# Patient Record
Sex: FEMALE | Race: BLACK OR AFRICAN AMERICAN | Employment: FULL TIME | URBAN - METROPOLITAN AREA
[De-identification: names, ages, dates, MRNs, and addresses within clinical notes are randomized per-mention and may not be internally consistent; named-entity substitution may affect disease eponyms.]

---

## 2017-05-17 ENCOUNTER — TELEPHONE (OUTPATIENT)
Dept: INTERNAL MEDICINE CLINIC | Facility: CLINIC | Age: 52
End: 2017-05-17

## 2017-05-18 ENCOUNTER — OFFICE VISIT (OUTPATIENT)
Dept: INTERNAL MEDICINE CLINIC | Facility: CLINIC | Age: 52
End: 2017-05-18

## 2017-05-18 VITALS
RESPIRATION RATE: 18 BRPM | DIASTOLIC BLOOD PRESSURE: 84 MMHG | TEMPERATURE: 98 F | SYSTOLIC BLOOD PRESSURE: 134 MMHG | WEIGHT: 170 LBS | HEIGHT: 61 IN | BODY MASS INDEX: 32.1 KG/M2 | HEART RATE: 82 BPM

## 2017-05-18 DIAGNOSIS — J01.90 ACUTE NON-RECURRENT SINUSITIS, UNSPECIFIED LOCATION: Primary | ICD-10-CM

## 2017-05-18 PROCEDURE — 99214 OFFICE O/P EST MOD 30 MIN: CPT | Performed by: INTERNAL MEDICINE

## 2017-05-18 PROCEDURE — 99212 OFFICE O/P EST SF 10 MIN: CPT | Performed by: INTERNAL MEDICINE

## 2017-05-18 RX ORDER — AZITHROMYCIN 250 MG/1
TABLET, FILM COATED ORAL
Qty: 6 TABLET | Refills: 0 | Status: SHIPPED | OUTPATIENT
Start: 2017-05-18 | End: 2017-12-18

## 2017-05-18 NOTE — PROGRESS NOTES
HPI:    Patient ID: Earl Agarwal is a 54year old female. Cough  This is a new problem. The current episode started 1 to 4 weeks ago (3 weeks). The problem has been unchanged. The problem occurs every few hours. The cough is non-productive.  Associat and rhinorrhea present. Right sinus exhibits maxillary sinus tenderness. Right sinus exhibits no frontal sinus tenderness. Left sinus exhibits maxillary sinus tenderness. Left sinus exhibits no frontal sinus tenderness.    Mouth/Throat: Oropharyngeal exudat

## 2017-05-30 ENCOUNTER — TELEPHONE (OUTPATIENT)
Dept: INTERNAL MEDICINE CLINIC | Facility: CLINIC | Age: 52
End: 2017-05-30

## 2017-05-30 NOTE — TELEPHONE ENCOUNTER
Per pt.'s request, most recent lab results faxed and confirmed sent to her podiatrist, Dr. Mayo Tijerina.

## 2017-05-30 NOTE — TELEPHONE ENCOUNTER
Pt requesting most recent labs to  Dr Ida Han/Podiatry  FAX# [phone]    States has fungus on toe nail- podiatrist wants to make sure liver ok before prescribing medication    Pt requesting callback to confirm

## 2017-09-29 ENCOUNTER — TELEPHONE (OUTPATIENT)
Dept: INTERNAL MEDICINE CLINIC | Facility: CLINIC | Age: 52
End: 2017-09-29

## 2017-09-29 RX ORDER — DILTIAZEM HYDROCHLORIDE EXTENDED-RELEASE TABLETS 240 MG/1
240 TABLET, EXTENDED RELEASE ORAL
Qty: 30 TABLET | Refills: 0 | Status: SHIPPED | OUTPATIENT
Start: 2017-09-29 | End: 2017-10-29

## 2017-09-29 NOTE — TELEPHONE ENCOUNTER
30-day refill sent per protocol to cover until pt can be seen for OV.     Hypertensive Medications  Protocol Criteria:  · Appointment scheduled in the past 6 months or in the next 3 months  · BMP or CMP in the past 12 months  · Creatinine result < 2  Recent

## 2017-09-29 NOTE — TELEPHONE ENCOUNTER
Pt called in requesting a refill on medication below. Pt states she only has a few days left, but she did schedule an appt on 10/2 to see Dr. Yen Park. Please advise on refill.     Current Outpatient Prescriptions:     •  Diltiazem HCl ER Coated Beads 240 MG O

## 2017-10-02 ENCOUNTER — OFFICE VISIT (OUTPATIENT)
Dept: INTERNAL MEDICINE CLINIC | Facility: CLINIC | Age: 52
End: 2017-10-02

## 2017-10-02 VITALS
HEIGHT: 62 IN | RESPIRATION RATE: 12 BRPM | WEIGHT: 177 LBS | TEMPERATURE: 98 F | BODY MASS INDEX: 32.57 KG/M2 | SYSTOLIC BLOOD PRESSURE: 152 MMHG | HEART RATE: 80 BPM | DIASTOLIC BLOOD PRESSURE: 83 MMHG

## 2017-10-02 DIAGNOSIS — S16.1XXA ACUTE STRAIN OF NECK MUSCLE, INITIAL ENCOUNTER: Primary | ICD-10-CM

## 2017-10-02 DIAGNOSIS — S39.012A LOW BACK STRAIN, INITIAL ENCOUNTER: ICD-10-CM

## 2017-10-02 PROCEDURE — 99214 OFFICE O/P EST MOD 30 MIN: CPT | Performed by: INTERNAL MEDICINE

## 2017-10-02 PROCEDURE — 99212 OFFICE O/P EST SF 10 MIN: CPT | Performed by: INTERNAL MEDICINE

## 2017-10-02 RX ORDER — CYCLOBENZAPRINE HCL 10 MG
1 TABLET ORAL
COMMUNITY
Start: 2017-09-28 | End: 2017-10-12

## 2017-10-12 ENCOUNTER — TELEPHONE (OUTPATIENT)
Dept: OTHER | Age: 52
End: 2017-10-12

## 2017-10-12 RX ORDER — CYCLOBENZAPRINE HCL 10 MG
10 TABLET ORAL
Qty: 30 TABLET | Refills: 0 | Status: SHIPPED | OUTPATIENT
Start: 2017-10-12 | End: 2018-08-06

## 2017-10-12 NOTE — TELEPHONE ENCOUNTER
Please advise on return to work  / light duty restriction note? Ok to write note? Pt is due to return to work Saturday but wants to make sure she can return on light duty.

## 2017-10-12 NOTE — TELEPHONE ENCOUNTER
Pt agreed with letter with light duty, pt states she still does not feel well but has to return to work. If she feels like she can not return just yet she will contact our office.

## 2017-10-12 NOTE — TELEPHONE ENCOUNTER
EL pt asking for refill of muscle relaxer and RTW note with light duty restriction    Pain in back and neck was 7/10 at LOV   Today pain level is 5/10 at back and neck, intermittent, Pt does not want to injure it more when she returns to work in 2 days, An

## 2017-10-24 ENCOUNTER — OFFICE VISIT (OUTPATIENT)
Dept: PHYSICAL THERAPY | Age: 52
End: 2017-10-24
Attending: INTERNAL MEDICINE
Payer: COMMERCIAL

## 2017-10-24 DIAGNOSIS — S16.1XXA ACUTE STRAIN OF NECK MUSCLE, INITIAL ENCOUNTER: ICD-10-CM

## 2017-10-24 DIAGNOSIS — S39.012A LOW BACK STRAIN, INITIAL ENCOUNTER: ICD-10-CM

## 2017-10-24 PROCEDURE — 97110 THERAPEUTIC EXERCISES: CPT

## 2017-10-24 PROCEDURE — 97162 PT EVAL MOD COMPLEX 30 MIN: CPT
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
## 2017-10-26 ENCOUNTER — OFFICE VISIT (OUTPATIENT)
Dept: PHYSICAL THERAPY | Age: 52
End: 2017-10-26
Attending: INTERNAL MEDICINE
Payer: COMMERCIAL

## 2017-10-26 DIAGNOSIS — S16.1XXA ACUTE STRAIN OF NECK MUSCLE, INITIAL ENCOUNTER: ICD-10-CM

## 2017-10-26 DIAGNOSIS — S39.012A LOW BACK STRAIN, INITIAL ENCOUNTER: ICD-10-CM

## 2017-10-26 PROCEDURE — 97110 THERAPEUTIC EXERCISES: CPT

## 2017-10-26 NOTE — PROGRESS NOTES
Dx: Acute strain of neck muscle, initial encounter (S16.1XXA)  Low back strain, initial encounter (E27.904M)                                 Next MD visit: none scheduled  Fall Risk: standard         Precautions: n/a           Medications: Yes/no: no  Subj

## 2017-10-29 RX ORDER — DILTIAZEM HYDROCHLORIDE 240 MG/1
CAPSULE, COATED, EXTENDED RELEASE ORAL
Qty: 90 CAPSULE | Refills: 1 | Status: SHIPPED | OUTPATIENT
Start: 2017-10-29 | End: 2018-05-10

## 2017-10-31 ENCOUNTER — APPOINTMENT (OUTPATIENT)
Dept: PHYSICAL THERAPY | Age: 52
End: 2017-10-31
Attending: INTERNAL MEDICINE
Payer: COMMERCIAL

## 2017-11-02 ENCOUNTER — OFFICE VISIT (OUTPATIENT)
Dept: PHYSICAL THERAPY | Age: 52
End: 2017-11-02
Attending: INTERNAL MEDICINE
Payer: COMMERCIAL

## 2017-11-02 DIAGNOSIS — S16.1XXA ACUTE STRAIN OF NECK MUSCLE, INITIAL ENCOUNTER: ICD-10-CM

## 2017-11-02 DIAGNOSIS — S39.012A LOW BACK STRAIN, INITIAL ENCOUNTER: ICD-10-CM

## 2017-11-02 PROCEDURE — 97110 THERAPEUTIC EXERCISES: CPT

## 2017-11-02 NOTE — PROGRESS NOTES
Dx: Acute strain of neck muscle, initial encounter (S16.1XXA)  Low back strain, initial encounter (L42.039A)                                 Next MD visit: none scheduled  Fall Risk: standard         Precautions: n/a           Medications: Yes/no: no  Subj Education done/HEP given:same exercises but will continue with scapula exercises    Plan: cont per POC    Charges:  Theraex x3       Total Timed Treatment: 45 min  Total Treatment Time: 45 min

## 2017-11-07 ENCOUNTER — TELEPHONE (OUTPATIENT)
Dept: PHYSICAL THERAPY | Age: 52
End: 2017-11-07

## 2017-11-07 ENCOUNTER — APPOINTMENT (OUTPATIENT)
Dept: PHYSICAL THERAPY | Age: 52
End: 2017-11-07
Attending: INTERNAL MEDICINE
Payer: COMMERCIAL

## 2017-11-10 ENCOUNTER — TELEPHONE (OUTPATIENT)
Dept: PHYSICAL THERAPY | Age: 52
End: 2017-11-10

## 2017-11-14 ENCOUNTER — OFFICE VISIT (OUTPATIENT)
Dept: PHYSICAL THERAPY | Age: 52
End: 2017-11-14
Attending: INTERNAL MEDICINE
Payer: COMMERCIAL

## 2017-11-14 DIAGNOSIS — S16.1XXA ACUTE STRAIN OF NECK MUSCLE, INITIAL ENCOUNTER: ICD-10-CM

## 2017-11-14 DIAGNOSIS — S39.012A LOW BACK STRAIN, INITIAL ENCOUNTER: ICD-10-CM

## 2017-11-14 PROCEDURE — 97110 THERAPEUTIC EXERCISES: CPT

## 2017-11-14 PROCEDURE — 97014 ELECTRIC STIMULATION THERAPY: CPT

## 2017-11-14 NOTE — PROGRESS NOTES
Dx: Acute strain of neck muscle, initial encounter (S16.1XXA)  Low back strain, initial encounter (E12.642X)                                 Next MD visit: none scheduled  Fall Risk: standard         Precautions: n/a           Medications: Yes/no: no  Subj 4 bands 2x10  Shuttle  BLE heel raises 3 bands 2x10 Shuttle BLE 6 bands  shutttle R/L LE 4 bands 2x10  Shuttle  BLE heel raises 3 bands 2x10     Supine SLR x10  IFC 1-150Hz x20 min with HP              Assessment: Imrpoved AROM om cervical and lumbar area

## 2017-11-16 ENCOUNTER — APPOINTMENT (OUTPATIENT)
Dept: PHYSICAL THERAPY | Age: 52
End: 2017-11-16
Attending: INTERNAL MEDICINE
Payer: COMMERCIAL

## 2017-11-21 ENCOUNTER — TELEPHONE (OUTPATIENT)
Dept: PHYSICAL THERAPY | Age: 52
End: 2017-11-21

## 2017-11-21 ENCOUNTER — APPOINTMENT (OUTPATIENT)
Dept: PHYSICAL THERAPY | Age: 52
End: 2017-11-21
Attending: INTERNAL MEDICINE
Payer: COMMERCIAL

## 2017-11-28 ENCOUNTER — TELEPHONE (OUTPATIENT)
Dept: PHYSICAL THERAPY | Age: 52
End: 2017-11-28

## 2017-11-28 ENCOUNTER — APPOINTMENT (OUTPATIENT)
Dept: PHYSICAL THERAPY | Age: 52
End: 2017-11-28
Attending: INTERNAL MEDICINE
Payer: COMMERCIAL

## 2017-12-01 ENCOUNTER — APPOINTMENT (OUTPATIENT)
Dept: PHYSICAL THERAPY | Age: 52
End: 2017-12-01
Attending: INTERNAL MEDICINE
Payer: COMMERCIAL

## 2017-12-04 ENCOUNTER — TELEPHONE (OUTPATIENT)
Dept: INTERNAL MEDICINE CLINIC | Facility: CLINIC | Age: 52
End: 2017-12-04

## 2017-12-04 DIAGNOSIS — Z12.39 BREAST CANCER SCREENING: Primary | ICD-10-CM

## 2017-12-04 NOTE — TELEPHONE ENCOUNTER
Pt called in stating that she is requesting her mammogram order. Pt has attempted to schedule appt and was informed she needed her order. Please advise once approved.

## 2017-12-04 NOTE — TELEPHONE ENCOUNTER
Pt is requesting an order for 1 year mammogram. Pt is requesting a call back once approved. Thank you.

## 2017-12-05 ENCOUNTER — OFFICE VISIT (OUTPATIENT)
Dept: PHYSICAL THERAPY | Age: 52
End: 2017-12-05
Attending: INTERNAL MEDICINE
Payer: COMMERCIAL

## 2017-12-05 PROCEDURE — 97014 ELECTRIC STIMULATION THERAPY: CPT

## 2017-12-05 PROCEDURE — 97110 THERAPEUTIC EXERCISES: CPT

## 2017-12-05 NOTE — PROGRESS NOTES
Dx: Acute strain of neck muscle, initial encounter (S16.1XXA)  Low back strain, initial encounter (Y89.279Y)                                 Next MD visit: none scheduled  Fall Risk: standard         Precautions: n/a           Medications: Yes/no: no  Subj then extension and with ER combo without bands x10x2    Overhead abd ball movement 2x10 then backward and forward around trunk     supine NWB rotation 10 secs x10 Pinky on the wall 2x10      Supine hooklying hip abd YTB 2x10 Shuttle BLE 6 bands  иван R dog  4. Pt will report decreased in pain and symptoms by at least 50% or better for ease of daily tasks like dressing               Education done/HEP given:same exercises but will continue with scapula exercises    Plan: cont per POC    Charges:  Theraex x2

## 2017-12-07 ENCOUNTER — OFFICE VISIT (OUTPATIENT)
Dept: PHYSICAL THERAPY | Age: 52
End: 2017-12-07
Attending: INTERNAL MEDICINE
Payer: COMMERCIAL

## 2017-12-07 PROCEDURE — 97110 THERAPEUTIC EXERCISES: CPT

## 2017-12-07 PROCEDURE — 97014 ELECTRIC STIMULATION THERAPY: CPT

## 2017-12-07 NOTE — PROGRESS NOTES
Dx: Acute strain of neck muscle, initial encounter (S16.1XXA)  Low back strain, initial encounter (H41.322D)                                 Next MD visit: none scheduled  Fall Risk: standard         Precautions: n/a           Medications: Yes/no: no  Subj pain , 4/10 on low back  SHAYNE x2x10  EIL x10 then HOB 2x10   Supine marches 2x10 Scapula retraction :YTB narrow  then extension and with ER combo without bands x10x2    Overhead abd ball movement 2x10 then backward and forward around trunk  Prone I  Prone h

## 2017-12-12 ENCOUNTER — APPOINTMENT (OUTPATIENT)
Dept: PHYSICAL THERAPY | Age: 52
End: 2017-12-12
Attending: INTERNAL MEDICINE
Payer: COMMERCIAL

## 2017-12-12 ENCOUNTER — TELEPHONE (OUTPATIENT)
Dept: PHYSICAL THERAPY | Age: 52
End: 2017-12-12

## 2017-12-13 ENCOUNTER — OFFICE VISIT (OUTPATIENT)
Dept: PHYSICAL THERAPY | Age: 52
End: 2017-12-13
Attending: INTERNAL MEDICINE
Payer: COMMERCIAL

## 2017-12-13 PROCEDURE — 97014 ELECTRIC STIMULATION THERAPY: CPT

## 2017-12-13 PROCEDURE — 97110 THERAPEUTIC EXERCISES: CPT

## 2017-12-13 NOTE — PROGRESS NOTES
Dx: Acute strain of neck muscle, initial encounter (S16.1XXA)  Low back strain, initial encounter (J87.331O)                                 Next MD visit: none scheduled  Fall Risk: standard         Precautions: n/a           Medications: Yes/no: no  Subj pain , 4/10 on low back  SHAYNE x2x10  EIL x10 then HOB 2x10   Shuttle BLE 6 bands 2x10   Shuttle R/L LE 4 bands 2x10 Scapula retraction :YTB narrow  then extension and with ER combo without bands x10x2    Overhead abd ball movement 2x10 then backward and for

## 2017-12-18 ENCOUNTER — OFFICE VISIT (OUTPATIENT)
Dept: PHYSICAL THERAPY | Age: 52
End: 2017-12-18
Attending: INTERNAL MEDICINE
Payer: COMMERCIAL

## 2017-12-18 ENCOUNTER — OFFICE VISIT (OUTPATIENT)
Dept: OBGYN CLINIC | Facility: CLINIC | Age: 52
End: 2017-12-18

## 2017-12-18 VITALS
SYSTOLIC BLOOD PRESSURE: 142 MMHG | DIASTOLIC BLOOD PRESSURE: 72 MMHG | HEIGHT: 62 IN | BODY MASS INDEX: 31.83 KG/M2 | HEART RATE: 84 BPM | WEIGHT: 173 LBS

## 2017-12-18 DIAGNOSIS — D25.1 INTRAMURAL, SUBMUCOUS, AND SUBSEROUS LEIOMYOMA OF UTERUS: ICD-10-CM

## 2017-12-18 DIAGNOSIS — D25.2 INTRAMURAL, SUBMUCOUS, AND SUBSEROUS LEIOMYOMA OF UTERUS: ICD-10-CM

## 2017-12-18 DIAGNOSIS — Z12.31 VISIT FOR SCREENING MAMMOGRAM: ICD-10-CM

## 2017-12-18 DIAGNOSIS — Z01.419 ENCOUNTER FOR GYNECOLOGICAL EXAMINATION WITHOUT ABNORMAL FINDING: Primary | ICD-10-CM

## 2017-12-18 DIAGNOSIS — D25.0 INTRAMURAL, SUBMUCOUS, AND SUBSEROUS LEIOMYOMA OF UTERUS: ICD-10-CM

## 2017-12-18 PROCEDURE — 97110 THERAPEUTIC EXERCISES: CPT

## 2017-12-18 PROCEDURE — 99396 PREV VISIT EST AGE 40-64: CPT | Performed by: OBSTETRICS & GYNECOLOGY

## 2017-12-18 PROCEDURE — 97014 ELECTRIC STIMULATION THERAPY: CPT

## 2017-12-18 PROCEDURE — 99213 OFFICE O/P EST LOW 20 MIN: CPT | Performed by: OBSTETRICS & GYNECOLOGY

## 2017-12-18 NOTE — PROGRESS NOTES
Dx: Acute strain of neck muscle, initial encounter (S16.1XXA)  Low back strain, initial encounter (Q83.774S)                                 Next MD visit: none scheduled  Fall Risk: standard         Precautions: n/a           Medications: Yes/no: no  Subj x2x10  Standing hip exercises : abd/extension and no resistance as pt had a difficult time lying on R side IFC 1-150Hz with HP Baseline: no RLE pain , 4/10 on low back  SHAYNE x2x10  EIL x10 then HOB 2x10   Shuttle BLE 6 bands 2x10   Shuttle R/L LE 4 bands 2x

## 2017-12-19 ENCOUNTER — OFFICE VISIT (OUTPATIENT)
Dept: PHYSICAL THERAPY | Age: 52
End: 2017-12-19
Attending: INTERNAL MEDICINE
Payer: COMMERCIAL

## 2017-12-19 ENCOUNTER — APPOINTMENT (OUTPATIENT)
Dept: PHYSICAL THERAPY | Age: 52
End: 2017-12-19
Attending: INTERNAL MEDICINE
Payer: COMMERCIAL

## 2017-12-19 PROCEDURE — 97110 THERAPEUTIC EXERCISES: CPT

## 2017-12-19 NOTE — PROGRESS NOTES
Dx: Acute strain of neck muscle, initial encounter (S16.1XXA)  Low back strain, initial encounter (P74.555H)                                 Next MD visit: none scheduled  Fall Risk: standard         Precautions: n/a           Medications: Yes/no: no  Subj BLE 6 bands 2x10   Shuttle R/L LE 5 bands 2x10   Prone I  Prone hip extension       IFC 1-150Hz x20 min with HP          IFC 1-150Hz x20 min with HP IFC 1-150Hz x20 min with HP               Assessment:   Trunk         Pain (+/-)   Flexion WFL -   Extensio

## 2017-12-20 NOTE — PROGRESS NOTES
Mickey Little is a 50year old female  Patient's last menstrual period was 2017.  Patient presents with:  Gyn Exam: annual  Other: wishes to review Rx options again re: large fibroids / paratubal cyst -- still only w/ increased urinary frequ Occupational History  None on file     Social History Main Topics   Smoking status: Never Smoker    Smokeless tobacco: Not on file    Alcohol use Yes    Comment: Wine, 2 drinks occasionally     Drug use: Unknown     Other Topics Concern    Caffeine Con kg/m²   Constitutional:  well developed, well nourished  Head/Face:  normocephalic  Neck/Thyroid: thyroid symmetric, no thyromegaly, no nodules, no adenopathy  Lymphatic: no abnormal supraclavicular or axillary adenopathy is noted  Breast:   normal without

## 2017-12-22 ENCOUNTER — HOSPITAL ENCOUNTER (OUTPATIENT)
Dept: MAMMOGRAPHY | Age: 52
Discharge: HOME | End: 2017-12-22
Attending: INTERNAL MEDICINE
Payer: COMMERCIAL

## 2017-12-22 DIAGNOSIS — Z12.39 BREAST CANCER SCREENING: ICD-10-CM

## 2017-12-22 PROCEDURE — 77067 SCR MAMMO BI INCL CAD: CPT | Performed by: INTERNAL MEDICINE

## 2017-12-26 ENCOUNTER — OFFICE VISIT (OUTPATIENT)
Dept: PHYSICAL THERAPY | Age: 52
End: 2017-12-26
Attending: INTERNAL MEDICINE
Payer: COMMERCIAL

## 2017-12-26 PROCEDURE — 97110 THERAPEUTIC EXERCISES: CPT

## 2017-12-26 NOTE — PROGRESS NOTES
Dx: Acute strain of neck muscle, initial encounter (S16.1XXA)  Low back strain, initial encounter (X85.087I)                                 Next MD visit: none scheduled  Fall Risk: standard         Precautions: n/a           Medications: Yes/no: no  Subj R/L LE 5 bands 2x10 Shuttle BLE 6 bands 2x10   Shuttle R/L LE 5 bands 2x10    Shuttle BLE 6 bands 2x10   Shuttle R/L LE 4 bands 2x10 Shuttle BLE 6 bands 2x10   Shuttle R/L LE 5 bands 2x10  Alternate bosu lunges 2x10  Lateal bosu lunges 2x10

## 2017-12-26 NOTE — PROGRESS NOTES
Patient Name: Royer Bar, : 1965, MRN: [id number]   Date:  2017  Referring Physician:  Basil Valencia    Diagnosis:  Acute strain of neck muscle, initial encounter (S16.1XXA)  Low back strain, initial encounter (U02.929L)    cervical area for ease of daily tasks such as functional transfers  3. Pt will demonstrate increased BUE/LE strength to safely return to PLOF as she loves to walk her dog  4. Pt will report decreased in pain and symptoms by at least 50% or better for ease o

## 2017-12-27 ENCOUNTER — APPOINTMENT (OUTPATIENT)
Dept: PHYSICAL THERAPY | Age: 52
End: 2017-12-27
Attending: INTERNAL MEDICINE
Payer: COMMERCIAL

## 2018-01-04 ENCOUNTER — TELEPHONE (OUTPATIENT)
Dept: OBGYN CLINIC | Facility: CLINIC | Age: 53
End: 2018-01-04

## 2018-01-04 DIAGNOSIS — R10.2 PELVIC PAIN: Primary | ICD-10-CM

## 2018-01-04 DIAGNOSIS — D25.9 UTERINE LEIOMYOMA, UNSPECIFIED LOCATION: ICD-10-CM

## 2018-01-04 NOTE — TELEPHONE ENCOUNTER
The pt is returning a nurse's call, and states that she has fibroids and is in a lot of pain. Please advise.

## 2018-01-04 NOTE — TELEPHONE ENCOUNTER
Pt saw Bobby Rouse on 12/18/17. Pt states that she discussed with [facility] about a knot, size small to medium above her belly button to the right. (Pt could not compare it to the size of something.)  At the visit it could not be felt. Pt can feel it now. She rates the pain a 4/10 at the present. Informed pt if her pain gets a 7-10 to go to the ED. (At the visit [facility]'s notes state consider pelvic us if wishes for hysterectomy to check on ovaries.)    Sent to [facility] for recs. Pt aware that [facility] is out of the office until Monday, 1/8.

## 2018-01-08 NOTE — TELEPHONE ENCOUNTER
Pt informed of NJGs recs below and verbalized understanding. Order placed for pelvic US. Pt advised to call central scheduling and then call our office back to let us know when she is scheduled for US, and then we can get pt in to see NJG 2 days after US. Pt verbalized understanding.

## 2018-01-08 NOTE — TELEPHONE ENCOUNTER
Most likely her fibroids -- get pelvic u/s done to evaluate & then come in to discuss results -- if leaning towards having surgery done, then would like to see her in next 1-2 weeks -- use MD 10 min slot -- need at least 2 days after u/s done

## 2018-01-09 ENCOUNTER — TELEPHONE (OUTPATIENT)
Dept: OBGYN CLINIC | Facility: CLINIC | Age: 53
End: 2018-01-09

## 2018-01-09 DIAGNOSIS — R10.2 PELVIC PAIN: Primary | ICD-10-CM

## 2018-01-09 NOTE — TELEPHONE ENCOUNTER
PER BETY CALLING FROM [address] [place] [address] DEPT / REQUESTING CLARIFICATION ON ORDER / PLS ADV

## 2018-01-12 NOTE — TELEPHONE ENCOUNTER
Called pt to touch base on message below. Pt stated she is scheduled for pelvic US on 1/19. Pt stated that she cannot schedule her f/u appt with NJ[facility] at this time because she does not know her schedule for the week of 1/22. Pt stated she will call back to schedule. OZ, when pt calls back please assist her with scheduling a 10 min f/u apt with [facility] on 1/23 or 1/24, ok to use RN approval slot if needed. And please send message back to triage pool to let us know when pts appt is. Thank you.

## 2018-01-19 ENCOUNTER — HOSPITAL ENCOUNTER (OUTPATIENT)
Dept: ULTRASOUND IMAGING | Age: 53
Discharge: HOME | End: 2018-01-19
Attending: OBSTETRICS & GYNECOLOGY
Payer: COMMERCIAL

## 2018-01-19 ENCOUNTER — TELEPHONE (OUTPATIENT)
Dept: OBGYN CLINIC | Facility: CLINIC | Age: 53
End: 2018-01-19

## 2018-01-19 DIAGNOSIS — R10.2 PELVIC PAIN: ICD-10-CM

## 2018-01-19 PROCEDURE — 76856 US EXAM PELVIC COMPLETE: CPT | Performed by: OBSTETRICS & GYNECOLOGY

## 2018-01-19 PROCEDURE — 76830 TRANSVAGINAL US NON-OB: CPT | Performed by: OBSTETRICS & GYNECOLOGY

## 2018-01-19 NOTE — TELEPHONE ENCOUNTER
pls note pt had her u/s done today per pt she was told to schedule a appt 2 days after her u/s  to see NJG for a f/u a appt has been made for 01/23/2018

## 2018-01-23 ENCOUNTER — APPOINTMENT (OUTPATIENT)
Dept: LAB | Facility: HOSPITAL | Age: 53
End: 2018-01-23
Attending: OBSTETRICS & GYNECOLOGY
Payer: COMMERCIAL

## 2018-01-23 ENCOUNTER — OFFICE VISIT (OUTPATIENT)
Dept: OBGYN CLINIC | Facility: CLINIC | Age: 53
End: 2018-01-23

## 2018-01-23 ENCOUNTER — TELEPHONE (OUTPATIENT)
Dept: OTHER | Age: 53
End: 2018-01-23

## 2018-01-23 VITALS — WEIGHT: 173 LBS | DIASTOLIC BLOOD PRESSURE: 90 MMHG | SYSTOLIC BLOOD PRESSURE: 138 MMHG | BODY MASS INDEX: 32 KG/M2

## 2018-01-23 DIAGNOSIS — D25.2 INTRAMURAL, SUBMUCOUS, AND SUBSEROUS LEIOMYOMA OF UTERUS: Primary | ICD-10-CM

## 2018-01-23 DIAGNOSIS — D50.0 IRON DEFICIENCY ANEMIA DUE TO CHRONIC BLOOD LOSS: ICD-10-CM

## 2018-01-23 DIAGNOSIS — D25.0 INTRAMURAL, SUBMUCOUS, AND SUBSEROUS LEIOMYOMA OF UTERUS: Primary | ICD-10-CM

## 2018-01-23 DIAGNOSIS — D25.1 INTRAMURAL, SUBMUCOUS, AND SUBSEROUS LEIOMYOMA OF UTERUS: Primary | ICD-10-CM

## 2018-01-23 PROCEDURE — 99213 OFFICE O/P EST LOW 20 MIN: CPT | Performed by: OBSTETRICS & GYNECOLOGY

## 2018-01-23 NOTE — TELEPHONE ENCOUNTER
Pt states Dr Theron Looney is recommending surgery to remove her fibroids and per pt has advised her to speak with her PCP first.    Recommended pt scheduled appt with Dr Susi Vu, but pt is asking to speak with PCP directly.   Will inform MD

## 2018-01-23 NOTE — TELEPHONE ENCOUNTER
Called pt; asking for labs since she will have cbc ordered by gyne. Pt will have surgery but not until summer. She also had recent labs done thru work in Dec 2017 so told her just give me a copy of her labs and just do cbc that her gyne ordered.

## 2018-01-23 NOTE — PROGRESS NOTES
Keri Koenig is a 50year old female [id number] Patient's last menstrual period was 12/20/2017. Patient presents with: Follow - Up: Pt in for a follow-up to her u/s for pelvic pain. Fibroids causing more pain. hx anemia; hx cHTN  .     OBSTETRICS HISTORY: 1 tablet (10 mg total) by mouth every 4 to 6 hours as needed. , Disp: 30 tablet, Rfl: 0  •  Ferrous Sulfate 325 (65 FE) MG Oral Tab, Take one tab po bid, Disp: 60 tablet, Rfl: 2  •  valsartan (DIOVAN) 80 MG Oral Tab, Take 1 tablet by mouth daily, Disp: 90 t

## 2018-02-01 ENCOUNTER — APPOINTMENT (OUTPATIENT)
Dept: LAB | Age: 53
End: 2018-02-01
Attending: OBSTETRICS & GYNECOLOGY
Payer: COMMERCIAL

## 2018-02-01 DIAGNOSIS — D50.0 IRON DEFICIENCY ANEMIA DUE TO CHRONIC BLOOD LOSS: ICD-10-CM

## 2018-02-01 LAB
ERYTHROCYTE [DISTWIDTH] IN BLOOD BY AUTOMATED COUNT: 17.9 % (ref 11–15)
HCT VFR BLD AUTO: 28.9 % (ref 35–48)
HGB BLD-MCNC: 8.9 G/DL (ref 12–16)
MCH RBC QN AUTO: 21.3 PG (ref 27–32)
MCHC RBC AUTO-ENTMCNC: 30.9 G/DL (ref 32–37)
MCV RBC AUTO: 68.8 FL (ref 80–100)
PLATELET # BLD AUTO: 422 K/UL (ref 140–400)
PMV BLD AUTO: 7.1 FL (ref 7.4–10.3)
RBC # BLD AUTO: 4.19 M/UL (ref 3.7–5.4)
WBC # BLD AUTO: 5.5 K/UL (ref 4–11)

## 2018-02-01 PROCEDURE — 36415 COLL VENOUS BLD VENIPUNCTURE: CPT

## 2018-02-01 PROCEDURE — 85027 COMPLETE CBC AUTOMATED: CPT

## 2018-05-10 RX ORDER — DILTIAZEM HYDROCHLORIDE 240 MG/1
CAPSULE, COATED, EXTENDED RELEASE ORAL
Qty: 90 CAPSULE | Refills: 1 | Status: SHIPPED | OUTPATIENT
Start: 2018-05-10 | End: 2019-05-30

## 2018-07-16 ENCOUNTER — TELEPHONE (OUTPATIENT)
Dept: INTERNAL MEDICINE CLINIC | Facility: CLINIC | Age: 53
End: 2018-07-16

## 2018-07-16 DIAGNOSIS — Z01.818 PREOP TESTING: Primary | ICD-10-CM

## 2018-07-16 NOTE — TELEPHONE ENCOUNTER
Pt wants to know if Dr. Amara Liu can prescribe her medication for knee pain that she had in the past. Please advise      Current Outpatient Prescriptions:        Cyclobenzaprine HCl 10 MG Oral Tab Take 1 tablet (10 mg total) by mouth every 4 to 6 hours as

## 2018-07-17 RX ORDER — CYCLOBENZAPRINE HCL 10 MG
10 TABLET ORAL
Qty: 30 TABLET | Refills: 0 | Status: CANCELLED | OUTPATIENT
Start: 2018-07-17

## 2018-07-18 ENCOUNTER — TELEPHONE (OUTPATIENT)
Dept: OBGYN CLINIC | Facility: CLINIC | Age: 53
End: 2018-07-18

## 2018-07-18 DIAGNOSIS — D50.9 IRON DEFICIENCY ANEMIA, UNSPECIFIED IRON DEFICIENCY ANEMIA TYPE: ICD-10-CM

## 2018-07-18 DIAGNOSIS — D25.9 UTERINE LEIOMYOMA, UNSPECIFIED LOCATION: Primary | ICD-10-CM

## 2018-07-18 NOTE — TELEPHONE ENCOUNTER
Please schedule the following surgery:    Procedure: SOPHIA/Bilateral salpingectomy    Date: per pt    Diagnosis: symtompatic fibroids, anemia    Admission:AM admit    Anesth: general    Preop Medical Clearance needed: ** Yes** -- make sure done    Additional

## 2018-07-19 NOTE — TELEPHONE ENCOUNTER
Patient is scheduled for 8/20/18 7:30am SOPHIA/GILDARDO TRIVEDI/SWAPNA requested. Pat orders. Instructions routed via [employer]. Patient informed.

## 2018-07-19 NOTE — TELEPHONE ENCOUNTER
Per [facility] MD/facility out of network. Out of network benefits Individual deductible $2000 (not met) covered at 50%. Once deductible is met covered at 100%. Prior Auth required for inpatient stay spoke with Daisy Sepulveda.  Who stated clinical info needed to be r

## 2018-07-20 ENCOUNTER — LAB ENCOUNTER (OUTPATIENT)
Dept: LAB | Age: 53
End: 2018-07-20
Attending: INTERNAL MEDICINE
Payer: COMMERCIAL

## 2018-07-20 ENCOUNTER — APPOINTMENT (OUTPATIENT)
Dept: LAB | Age: 53
End: 2018-07-20
Attending: INTERNAL MEDICINE
Payer: COMMERCIAL

## 2018-07-20 DIAGNOSIS — Z01.818 PREOP TESTING: ICD-10-CM

## 2018-07-20 LAB
ALBUMIN SERPL BCP-MCNC: 3.6 G/DL (ref 3.5–4.8)
ALBUMIN/GLOB SERPL: 1.1 {RATIO} (ref 1–2)
ALP SERPL-CCNC: 66 U/L (ref 32–100)
ALT SERPL-CCNC: 39 U/L (ref 14–54)
ANION GAP SERPL CALC-SCNC: 6 MMOL/L (ref 0–18)
AST SERPL-CCNC: 25 U/L (ref 15–41)
BASOPHILS # BLD: 0 K/UL (ref 0–0.2)
BASOPHILS NFR BLD: 1 %
BILIRUB SERPL-MCNC: 0.3 MG/DL (ref 0.3–1.2)
BUN SERPL-MCNC: 10 MG/DL (ref 8–20)
BUN/CREAT SERPL: 14.5 (ref 10–20)
CALCIUM SERPL-MCNC: 8.6 MG/DL (ref 8.5–10.5)
CHLORIDE SERPL-SCNC: 108 MMOL/L (ref 95–110)
CO2 SERPL-SCNC: 25 MMOL/L (ref 22–32)
CREAT SERPL-MCNC: 0.69 MG/DL (ref 0.5–1.5)
EOSINOPHIL # BLD: 0.3 K/UL (ref 0–0.7)
EOSINOPHIL NFR BLD: 7 %
ERYTHROCYTE [DISTWIDTH] IN BLOOD BY AUTOMATED COUNT: 19.6 % (ref 11–15)
GLOBULIN PLAS-MCNC: 3.2 G/DL (ref 2.5–3.7)
GLUCOSE SERPL-MCNC: 94 MG/DL (ref 70–99)
HCT VFR BLD AUTO: 29.4 % (ref 35–48)
HGB BLD-MCNC: 9 G/DL (ref 12–16)
LYMPHOCYTES # BLD: 1.3 K/UL (ref 1–4)
LYMPHOCYTES NFR BLD: 31 %
MCH RBC QN AUTO: 20.9 PG (ref 27–32)
MCHC RBC AUTO-ENTMCNC: 30.7 G/DL (ref 32–37)
MCV RBC AUTO: 68 FL (ref 80–100)
MONOCYTES # BLD: 0.5 K/UL (ref 0–1)
MONOCYTES NFR BLD: 12 %
NEUTROPHILS # BLD AUTO: 2 K/UL (ref 1.8–7.7)
NEUTROPHILS NFR BLD: 49 %
OSMOLALITY UR CALC.SUM OF ELEC: 287 MOSM/KG (ref 275–295)
PATIENT FASTING: YES
PLATELET # BLD AUTO: 350 K/UL (ref 140–400)
PMV BLD AUTO: 7.2 FL (ref 7.4–10.3)
POTASSIUM SERPL-SCNC: 4 MMOL/L (ref 3.3–5.1)
PROT SERPL-MCNC: 6.8 G/DL (ref 5.9–8.4)
RBC # BLD AUTO: 4.33 M/UL (ref 3.7–5.4)
SODIUM SERPL-SCNC: 139 MMOL/L (ref 136–144)
WBC # BLD AUTO: 4.1 K/UL (ref 4–11)

## 2018-07-20 PROCEDURE — 93010 ELECTROCARDIOGRAM REPORT: CPT | Performed by: INTERNAL MEDICINE

## 2018-07-20 PROCEDURE — 93005 ELECTROCARDIOGRAM TRACING: CPT

## 2018-07-20 PROCEDURE — 80053 COMPREHEN METABOLIC PANEL: CPT

## 2018-07-20 PROCEDURE — 85025 COMPLETE CBC W/AUTO DIFF WBC: CPT

## 2018-07-20 PROCEDURE — 36415 COLL VENOUS BLD VENIPUNCTURE: CPT

## 2018-07-21 NOTE — TELEPHONE ENCOUNTER
PT NOTIFIED OF SURG DATE, TIME AND THAT INSTRUCTIONS ARE IN MY CHART. PT WILL CALL HER INSURANCE ON MON TO GET SOME MORE INFO. SHE IS AWARE VANDANA IS WAITING FOR AUTHORIZATION FOR THE SURGERY AND WILL CONTACT HER ONCE SHE GETS A RESPONSE.

## 2018-07-23 NOTE — TELEPHONE ENCOUNTER
Patient called and informed with understanding. Phone call transferred to call center to schedule the appointment.

## 2018-07-23 NOTE — TELEPHONE ENCOUNTER
Patient stated she did receive the message. Patient stated a letter for surgery clearance needs to be sent. Please advise I do not see a letter written.     Please respond to pool: COURTNEY LOPEZO STERLING/BRADLEY em

## 2018-07-23 NOTE — TELEPHONE ENCOUNTER
Pt needs to be seen in clinic for medical clearance to be issued.  Last time we had seen her was in Oct 2017

## 2018-08-03 ENCOUNTER — OFFICE VISIT (OUTPATIENT)
Dept: INTERNAL MEDICINE CLINIC | Facility: CLINIC | Age: 53
End: 2018-08-03
Payer: COMMERCIAL

## 2018-08-03 VITALS
WEIGHT: 170 LBS | HEART RATE: 78 BPM | TEMPERATURE: 99 F | SYSTOLIC BLOOD PRESSURE: 122 MMHG | HEIGHT: 61 IN | DIASTOLIC BLOOD PRESSURE: 80 MMHG | RESPIRATION RATE: 12 BRPM | BODY MASS INDEX: 32.1 KG/M2

## 2018-08-03 DIAGNOSIS — Z01.818 PREOPERATIVE CLEARANCE: Primary | ICD-10-CM

## 2018-08-03 DIAGNOSIS — I10 ESSENTIAL HYPERTENSION: ICD-10-CM

## 2018-08-03 DIAGNOSIS — D50.0 IRON DEFICIENCY ANEMIA DUE TO CHRONIC BLOOD LOSS: ICD-10-CM

## 2018-08-03 DIAGNOSIS — R94.31 ABNORMAL EKG: ICD-10-CM

## 2018-08-03 DIAGNOSIS — D25.9 UTERINE LEIOMYOMA, UNSPECIFIED LOCATION: ICD-10-CM

## 2018-08-03 PROCEDURE — 99214 OFFICE O/P EST MOD 30 MIN: CPT | Performed by: INTERNAL MEDICINE

## 2018-08-03 PROCEDURE — 99212 OFFICE O/P EST SF 10 MIN: CPT | Performed by: INTERNAL MEDICINE

## 2018-08-03 NOTE — PROGRESS NOTES
HPI:    Patient ID: Sudarshan Shetty is a 48year old female. Patient presents today for preop medical clearance as requested by Dr Kristian Burgess . Patient is scheduled to have total abdominal hysterectomy and bilateral salpingectomy on Aug 20, 2018 at [address] [place] [address].  She present. Cardiovascular: Normal rate, regular rhythm, normal heart sounds and intact distal pulses. Exam reveals no gallop and no friction rub. No murmur heard. Pulmonary/Chest: Effort normal and breath sounds normal. No respiratory distress.  She ha

## 2018-08-05 ENCOUNTER — TELEPHONE (OUTPATIENT)
Dept: INTERNAL MEDICINE CLINIC | Facility: CLINIC | Age: 53
End: 2018-08-05

## 2018-08-05 PROBLEM — D25.9 UTERINE LEIOMYOMA: Status: ACTIVE | Noted: 2018-08-05

## 2018-08-05 PROBLEM — R94.31 ABNORMAL EKG: Status: ACTIVE | Noted: 2018-08-05

## 2018-08-05 PROBLEM — Z01.818 PREOPERATIVE CLEARANCE: Status: ACTIVE | Noted: 2018-08-05

## 2018-08-05 NOTE — TELEPHONE ENCOUNTER
HI Managed care    I had ordered a stress echo for this patient for cardiac clearance before her upcoming surgery which is coming in 2 weeks. She had abnormal ekg which is new when compared to old ekg.  pls have stress echo get approved as soon as you can g

## 2018-08-09 NOTE — TELEPHONE ENCOUNTER
Yuval Faust Dr, Aim is requesting a peer review for the stress test that was ordered. Please call [phone] to engage in a physician to physician review.  There is no case number or phone prompts listed on this request.     Thank you,   Ramesh Cordova

## 2018-08-09 NOTE — TELEPHONE ENCOUNTER
Message left for patient to call and schedule possible with Dr. Munir Elias Monday 8/13 at 4:30 pm ask to arrive by 4:15.  Put call thru to RN to schedule

## 2018-08-09 NOTE — TELEPHONE ENCOUNTER
Patient called and informed with understanding. Requested information to be sent in [employer]. Cardiology can you please assist and getting the patient in to see a cardiologist ASAP. Thanks.

## 2018-08-09 NOTE — TELEPHONE ENCOUNTER
I talked to Genaro Lover who stated she tried to call the cardiology office and she could not get through. Patient stated she will take the appointment. Please contact her to confirm. Thank You.     The cardiac stress echo was cancelled for the patient does n

## 2018-08-09 NOTE — TELEPHONE ENCOUNTER
I called her insurance and I was told stress echo was denied by insurance. Pls call and notify pt.  I want her then to see cardiologist for cardiac clearance for her surgery coming on Aug 20, 2018; we can refer to [facility] Dr Batsheva Fields or partners but pl

## 2018-08-10 NOTE — TELEPHONE ENCOUNTER
LB is cancelling office Monday. Pt given appt Tuesday 8/14 with NP. Cardiologist also available in clinic.

## 2018-08-14 ENCOUNTER — OFFICE VISIT (OUTPATIENT)
Dept: CARDIOLOGY CLINIC | Facility: CLINIC | Age: 53
End: 2018-08-14
Payer: COMMERCIAL

## 2018-08-14 ENCOUNTER — TELEPHONE (OUTPATIENT)
Dept: OBGYN CLINIC | Facility: CLINIC | Age: 53
End: 2018-08-14

## 2018-08-14 ENCOUNTER — HOSPITAL ENCOUNTER (OUTPATIENT)
Dept: CV DIAGNOSTICS | Facility: HOSPITAL | Age: 53
Discharge: HOME | End: 2018-08-14
Attending: NURSE PRACTITIONER
Payer: COMMERCIAL

## 2018-08-14 ENCOUNTER — TELEPHONE (OUTPATIENT)
Dept: CARDIOLOGY CLINIC | Facility: CLINIC | Age: 53
End: 2018-08-14

## 2018-08-14 VITALS
SYSTOLIC BLOOD PRESSURE: 150 MMHG | DIASTOLIC BLOOD PRESSURE: 72 MMHG | HEART RATE: 64 BPM | RESPIRATION RATE: 13 BRPM | BODY MASS INDEX: 33 KG/M2 | WEIGHT: 172.38 LBS

## 2018-08-14 DIAGNOSIS — R94.31 ABNORMAL EKG: ICD-10-CM

## 2018-08-14 DIAGNOSIS — R94.31 ABNORMAL EKG: Primary | ICD-10-CM

## 2018-08-14 DIAGNOSIS — Z01.818 PREOP TESTING: ICD-10-CM

## 2018-08-14 PROCEDURE — 85025 COMPLETE CBC W/AUTO DIFF WBC: CPT | Performed by: OBSTETRICS & GYNECOLOGY

## 2018-08-14 PROCEDURE — 99212 OFFICE O/P EST SF 10 MIN: CPT | Performed by: NURSE PRACTITIONER

## 2018-08-14 PROCEDURE — 84450 TRANSFERASE (AST) (SGOT): CPT | Performed by: NURSE PRACTITIONER

## 2018-08-14 PROCEDURE — 80061 LIPID PANEL: CPT | Performed by: NURSE PRACTITIONER

## 2018-08-14 PROCEDURE — 93016 CV STRESS TEST SUPVJ ONLY: CPT | Performed by: NURSE PRACTITIONER

## 2018-08-14 PROCEDURE — 36415 COLL VENOUS BLD VENIPUNCTURE: CPT | Performed by: NURSE PRACTITIONER

## 2018-08-14 PROCEDURE — 86850 RBC ANTIBODY SCREEN: CPT | Performed by: OBSTETRICS & GYNECOLOGY

## 2018-08-14 PROCEDURE — 86900 BLOOD TYPING SEROLOGIC ABO: CPT | Performed by: OBSTETRICS & GYNECOLOGY

## 2018-08-14 PROCEDURE — 93018 CV STRESS TEST I&R ONLY: CPT | Performed by: NURSE PRACTITIONER

## 2018-08-14 PROCEDURE — 99204 OFFICE O/P NEW MOD 45 MIN: CPT | Performed by: NURSE PRACTITIONER

## 2018-08-14 PROCEDURE — 86901 BLOOD TYPING SEROLOGIC RH(D): CPT | Performed by: OBSTETRICS & GYNECOLOGY

## 2018-08-14 PROCEDURE — 93017 CV STRESS TEST TRACING ONLY: CPT | Performed by: NURSE PRACTITIONER

## 2018-08-14 PROCEDURE — 84460 ALANINE AMINO (ALT) (SGPT): CPT | Performed by: NURSE PRACTITIONER

## 2018-08-14 NOTE — TELEPHONE ENCOUNTER
Per automated system, Policy is active. Reference [id number]. Spoke with Macie schwarz: PA for    CARD TREADMILL STRESS, ADULT (VDY=[id number]) (Order #[id number]) on 8/14/18  Associated Diagnoses     R94.31 Abnormal EKG     PA is not needed.  Reference [id number]

## 2018-08-14 NOTE — TELEPHONE ENCOUNTER
Pt is scheduled for [facility] on 8/20/18 and her employer is requesting letter stating how long she will be off of work for. Requesting letter to be faxed to [phone]. Sofia Abraham.

## 2018-08-14 NOTE — PROGRESS NOTES
Trell Kruger is a 48year old female. Patient presents with:  Pre-Op: hysterectomy, 8/20    HPI:   Patient comes in today for a checkup prior to scheduled total abdominal hysterectomy next week.   Her primary doctor saw her for preop clearance EKG was ab Unspecified essential hypertension 2005   • Visual impairment     glaases      Social History:  Smoking status: Never Smoker                                                              Smokeless tobacco: Never Used                      Alcohol use:  Yes in as needed.       KARI Saavedra  8/14/2018  1:34 PM

## 2018-08-14 NOTE — TELEPHONE ENCOUNTER
SPOKE WITH PT AND REVIEWED MAJOR CASE INSTRUCTIONS AND ANTIMICROBIAL 8 Magda Baldwin. ADVISED NO ASA PRODUCTS OR HERBAL SUPPLEMENTS. STATES SHE TAKES A REGULAR MULTI VITAMIN AND CAN CONTINUE TO TAKE THAT.   [facility] WILL CALL HER Friday AFTER 2PM TO LET HER KNOW EXACTLY

## 2018-08-15 ENCOUNTER — TELEPHONE (OUTPATIENT)
Dept: CARDIOLOGY CLINIC | Facility: CLINIC | Age: 53
End: 2018-08-15

## 2018-08-15 ENCOUNTER — TELEPHONE (OUTPATIENT)
Dept: INTERNAL MEDICINE CLINIC | Facility: CLINIC | Age: 53
End: 2018-08-15

## 2018-08-15 NOTE — TELEPHONE ENCOUNTER
Call pt, her treadmill stress test was normal.  she is medically cleared for her surgery this week.  I will send message to her gyne

## 2018-08-15 NOTE — TELEPHONE ENCOUNTER
[facility]. Please transfer call to Cardiology RN at ext. [phone].  Thanks !!

## 2018-08-15 NOTE — TELEPHONE ENCOUNTER
Returned call to Chris Laguerre to advise of KARI KISER note. Noted can leave message at cell number. Detailed message left that it was normal and she may proceed with surgery.  Number left to call back if she has further questions or need the surgical center advised o

## 2018-08-15 NOTE — TELEPHONE ENCOUNTER
----- Message from KARI Collins sent at 8/14/2018  4:26 PM CDT -----  Stress test was normal, pt may proceed with surgery at acceptable risk

## 2018-08-15 NOTE — TELEPHONE ENCOUNTER
Pt stts she completed stress test and was informed it is normal. Pt asking for clearance to proceed with surgery that is scheduled on Monday 8/20

## 2018-08-15 NOTE — TELEPHONE ENCOUNTER
HI Dr Keren Robles,    I saw Issa Craigams for her medical clearance for her upcoming TAHBSO. She is stable and medically cleared to proceed with her sugery as planned. Thank you.   Roberto Gloria

## 2018-08-15 NOTE — TELEPHONE ENCOUNTER
Shabana Joshua returned call and asked I send this message of cardiac clearance for surgery to your office.

## 2018-08-16 NOTE — TELEPHONE ENCOUNTER
Per [address]  Cpt [id number] was approved for inpatient 7 day stay 8/20/18-8/27/18 [facility] [id number]

## 2018-08-17 NOTE — H&P
[address] [address] Patient Status:  Surgery Admit - Inpt    1965 MRN [id number]   Location [place] [address] Attending Kathya Esquivel MD   Hosp Day # 0 PCP Aries Mart[facility]      NORMAL SPONT   MAGALI           Past GYN History:   No abn pap    Social History:     Smoking status: Never Smoker    Smokeless tobacco: Never Used    Alcohol use Yes    Comment: Wine, 2 drinks occasionally         Review of Systems:     A comprehen mm (774 mL). ENDOMETRIUM:           Endometrial thickness is 2.0-mm. No fluid or mass in the endometrial canal.    MYOMETRIUM:             Myometrium is heterogeneous.   In the right-fundal region of the uterus there is a 65 x 58 x 70 mm heterogeneous AM

## 2018-08-20 ENCOUNTER — ANESTHESIA (OUTPATIENT)
Dept: SURGERY | Facility: HOSPITAL | Age: 53
DRG: 743 | End: 2018-08-20
Payer: COMMERCIAL

## 2018-08-20 ENCOUNTER — ANESTHESIA EVENT (OUTPATIENT)
Dept: SURGERY | Facility: HOSPITAL | Age: 53
DRG: 743 | End: 2018-08-20
Payer: COMMERCIAL

## 2018-08-20 ENCOUNTER — HOSPITAL ENCOUNTER (INPATIENT)
Facility: HOSPITAL | Age: 53
LOS: 2 days | Discharge: HOME | DRG: 743 | End: 2018-08-22
Attending: OBSTETRICS & GYNECOLOGY | Admitting: OBSTETRICS & GYNECOLOGY
Payer: COMMERCIAL

## 2018-08-20 ENCOUNTER — SURGERY (OUTPATIENT)
Age: 53
End: 2018-08-20

## 2018-08-20 DIAGNOSIS — Z01.818 PREOP TESTING: Primary | ICD-10-CM

## 2018-08-20 DIAGNOSIS — D50.9 IRON DEFICIENCY ANEMIA, UNSPECIFIED IRON DEFICIENCY ANEMIA TYPE: ICD-10-CM

## 2018-08-20 DIAGNOSIS — D25.9 UTERINE LEIOMYOMA, UNSPECIFIED LOCATION: ICD-10-CM

## 2018-08-20 PROBLEM — D21.9 FIBROID: Status: ACTIVE | Noted: 2018-08-20

## 2018-08-20 LAB — B-HCG UR QL: NEGATIVE

## 2018-08-20 PROCEDURE — 0UT70ZZ RESECTION OF BILATERAL FALLOPIAN TUBES, OPEN APPROACH: ICD-10-PCS | Performed by: OBSTETRICS & GYNECOLOGY

## 2018-08-20 PROCEDURE — 58150 TOTAL HYSTERECTOMY: CPT | Performed by: OBSTETRICS & GYNECOLOGY

## 2018-08-20 PROCEDURE — 0UT90ZZ RESECTION OF UTERUS, OPEN APPROACH: ICD-10-PCS | Performed by: OBSTETRICS & GYNECOLOGY

## 2018-08-20 RX ORDER — ONDANSETRON 2 MG/ML
4 INJECTION INTRAMUSCULAR; INTRAVENOUS ONCE AS NEEDED
Status: ACTIVE | OUTPATIENT
Start: 2018-08-20 | End: 2018-08-20

## 2018-08-20 RX ORDER — NALOXONE HYDROCHLORIDE 0.4 MG/ML
0.08 INJECTION, SOLUTION INTRAMUSCULAR; INTRAVENOUS; SUBCUTANEOUS
Status: DISCONTINUED | OUTPATIENT
Start: 2018-08-20 | End: 2018-08-22

## 2018-08-20 RX ORDER — ONDANSETRON 2 MG/ML
INJECTION INTRAMUSCULAR; INTRAVENOUS AS NEEDED
Status: DISCONTINUED | OUTPATIENT
Start: 2018-08-20 | End: 2018-08-20 | Stop reason: SURG

## 2018-08-20 RX ORDER — DEXAMETHASONE SODIUM PHOSPHATE 4 MG/ML
VIAL (ML) INJECTION AS NEEDED
Status: DISCONTINUED | OUTPATIENT
Start: 2018-08-20 | End: 2018-08-20 | Stop reason: SURG

## 2018-08-20 RX ORDER — DILTIAZEM HYDROCHLORIDE 240 MG/1
240 CAPSULE, COATED, EXTENDED RELEASE ORAL DAILY
Status: DISCONTINUED | OUTPATIENT
Start: 2018-08-20 | End: 2018-08-22

## 2018-08-20 RX ORDER — SODIUM CHLORIDE 0.9 % (FLUSH) 0.9 %
10 SYRINGE (ML) INJECTION AS NEEDED
Status: DISCONTINUED | OUTPATIENT
Start: 2018-08-20 | End: 2018-08-22

## 2018-08-20 RX ORDER — NALOXONE HYDROCHLORIDE 0.4 MG/ML
80 INJECTION, SOLUTION INTRAMUSCULAR; INTRAVENOUS; SUBCUTANEOUS AS NEEDED
Status: ACTIVE | OUTPATIENT
Start: 2018-08-20 | End: 2018-08-20

## 2018-08-20 RX ORDER — NEOSTIGMINE METHYLSULFATE 0.5 MG/ML
INJECTION INTRAVENOUS AS NEEDED
Status: DISCONTINUED | OUTPATIENT
Start: 2018-08-20 | End: 2018-08-20 | Stop reason: SURG

## 2018-08-20 RX ORDER — LABETALOL HYDROCHLORIDE 5 MG/ML
5 INJECTION, SOLUTION INTRAVENOUS EVERY 10 MIN PRN
Status: DISCONTINUED | OUTPATIENT
Start: 2018-08-20 | End: 2018-08-22

## 2018-08-20 RX ORDER — KETOROLAC TROMETHAMINE 30 MG/ML
30 INJECTION, SOLUTION INTRAMUSCULAR; INTRAVENOUS EVERY 6 HOURS
Status: COMPLETED | OUTPATIENT
Start: 2018-08-20 | End: 2018-08-22

## 2018-08-20 RX ORDER — HALOPERIDOL 5 MG/ML
0.25 INJECTION INTRAMUSCULAR ONCE AS NEEDED
Status: ACTIVE | OUTPATIENT
Start: 2018-08-20 | End: 2018-08-20

## 2018-08-20 RX ORDER — MORPHINE SULFATE 10 MG/ML
6 INJECTION, SOLUTION INTRAMUSCULAR; INTRAVENOUS EVERY 10 MIN PRN
Status: DISCONTINUED | OUTPATIENT
Start: 2018-08-20 | End: 2018-08-22

## 2018-08-20 RX ORDER — METOCLOPRAMIDE 10 MG/1
10 TABLET ORAL ONCE
Status: COMPLETED | OUTPATIENT
Start: 2018-08-20 | End: 2018-08-20

## 2018-08-20 RX ORDER — ONDANSETRON 2 MG/ML
4 INJECTION INTRAMUSCULAR; INTRAVENOUS EVERY 6 HOURS PRN
Status: DISCONTINUED | OUTPATIENT
Start: 2018-08-20 | End: 2018-08-22

## 2018-08-20 RX ORDER — ACETAMINOPHEN 500 MG
1000 TABLET ORAL ONCE
Status: COMPLETED | OUTPATIENT
Start: 2018-08-20 | End: 2018-08-20

## 2018-08-20 RX ORDER — SODIUM CHLORIDE, SODIUM LACTATE, POTASSIUM CHLORIDE, CALCIUM CHLORIDE 600; 310; 30; 20 MG/100ML; MG/100ML; MG/100ML; MG/100ML
INJECTION, SOLUTION INTRAVENOUS CONTINUOUS
Status: DISCONTINUED | OUTPATIENT
Start: 2018-08-20 | End: 2018-08-22

## 2018-08-20 RX ORDER — DIPHENHYDRAMINE HYDROCHLORIDE 50 MG/ML
12.5 INJECTION INTRAMUSCULAR; INTRAVENOUS EVERY 4 HOURS PRN
Status: DISCONTINUED | OUTPATIENT
Start: 2018-08-20 | End: 2018-08-22

## 2018-08-20 RX ORDER — LABETALOL HYDROCHLORIDE 5 MG/ML
INJECTION, SOLUTION INTRAVENOUS AS NEEDED
Status: DISCONTINUED | OUTPATIENT
Start: 2018-08-20 | End: 2018-08-20 | Stop reason: SURG

## 2018-08-20 RX ORDER — MORPHINE SULFATE 4 MG/ML
2 INJECTION, SOLUTION INTRAMUSCULAR; INTRAVENOUS EVERY 10 MIN PRN
Status: DISCONTINUED | OUTPATIENT
Start: 2018-08-20 | End: 2018-08-22

## 2018-08-20 RX ORDER — HYDROCODONE BITARTRATE AND ACETAMINOPHEN 5; 325 MG/1; MG/1
1 TABLET ORAL AS NEEDED
Status: DISCONTINUED | OUTPATIENT
Start: 2018-08-20 | End: 2018-08-22

## 2018-08-20 RX ORDER — LOSARTAN POTASSIUM 50 MG/1
50 TABLET ORAL DAILY
Status: DISCONTINUED | OUTPATIENT
Start: 2018-08-20 | End: 2018-08-22

## 2018-08-20 RX ORDER — DEXTROSE, SODIUM CHLORIDE, SODIUM LACTATE, POTASSIUM CHLORIDE, AND CALCIUM CHLORIDE 5; .6; .31; .03; .02 G/100ML; G/100ML; G/100ML; G/100ML; G/100ML
INJECTION, SOLUTION INTRAVENOUS CONTINUOUS
Status: DISCONTINUED | OUTPATIENT
Start: 2018-08-20 | End: 2018-08-22

## 2018-08-20 RX ORDER — MORPHINE SULFATE 4 MG/ML
4 INJECTION, SOLUTION INTRAMUSCULAR; INTRAVENOUS EVERY 10 MIN PRN
Status: DISCONTINUED | OUTPATIENT
Start: 2018-08-20 | End: 2018-08-22

## 2018-08-20 RX ORDER — CEFAZOLIN SODIUM/WATER 2 G/20 ML
2 SYRINGE (ML) INTRAVENOUS ONCE
Status: COMPLETED | OUTPATIENT
Start: 2018-08-20 | End: 2018-08-20

## 2018-08-20 RX ORDER — ROCURONIUM BROMIDE 10 MG/ML
INJECTION, SOLUTION INTRAVENOUS AS NEEDED
Status: DISCONTINUED | OUTPATIENT
Start: 2018-08-20 | End: 2018-08-20 | Stop reason: SURG

## 2018-08-20 RX ORDER — NALBUPHINE HCL 10 MG/ML
2.5 AMPUL (ML) INJECTION EVERY 4 HOURS PRN
Status: DISCONTINUED | OUTPATIENT
Start: 2018-08-20 | End: 2018-08-22

## 2018-08-20 RX ORDER — MIDAZOLAM HYDROCHLORIDE 1 MG/ML
INJECTION INTRAMUSCULAR; INTRAVENOUS AS NEEDED
Status: DISCONTINUED | OUTPATIENT
Start: 2018-08-20 | End: 2018-08-20 | Stop reason: SURG

## 2018-08-20 RX ORDER — LIDOCAINE HYDROCHLORIDE 10 MG/ML
INJECTION, SOLUTION EPIDURAL; INFILTRATION; INTRACAUDAL; PERINEURAL AS NEEDED
Status: DISCONTINUED | OUTPATIENT
Start: 2018-08-20 | End: 2018-08-20 | Stop reason: SURG

## 2018-08-20 RX ORDER — FAMOTIDINE 20 MG/1
20 TABLET ORAL ONCE
Status: COMPLETED | OUTPATIENT
Start: 2018-08-20 | End: 2018-08-20

## 2018-08-20 RX ORDER — HYDRALAZINE HYDROCHLORIDE 20 MG/ML
5 INJECTION INTRAMUSCULAR; INTRAVENOUS
Status: DISCONTINUED | OUTPATIENT
Start: 2018-08-20 | End: 2018-08-22

## 2018-08-20 RX ORDER — GLYCOPYRROLATE 0.2 MG/ML
INJECTION INTRAMUSCULAR; INTRAVENOUS AS NEEDED
Status: DISCONTINUED | OUTPATIENT
Start: 2018-08-20 | End: 2018-08-20 | Stop reason: SURG

## 2018-08-20 RX ORDER — SODIUM CHLORIDE 0.9 % (FLUSH) 0.9 %
10 SYRINGE (ML) INJECTION AS NEEDED
Status: DISCONTINUED | OUTPATIENT
Start: 2018-08-20 | End: 2018-08-20 | Stop reason: HOSPADM

## 2018-08-20 RX ORDER — HYDROCODONE BITARTRATE AND ACETAMINOPHEN 5; 325 MG/1; MG/1
2 TABLET ORAL AS NEEDED
Status: DISCONTINUED | OUTPATIENT
Start: 2018-08-20 | End: 2018-08-22

## 2018-08-20 RX ORDER — SODIUM CHLORIDE 9 MG/ML
INJECTION, SOLUTION INTRAVENOUS CONTINUOUS PRN
Status: DISCONTINUED | OUTPATIENT
Start: 2018-08-20 | End: 2018-08-20 | Stop reason: SURG

## 2018-08-20 RX ORDER — HYDROMORPHONE HYDROCHLORIDE 1 MG/ML
0.4 INJECTION, SOLUTION INTRAMUSCULAR; INTRAVENOUS; SUBCUTANEOUS EVERY 30 MIN PRN
Status: DISCONTINUED | OUTPATIENT
Start: 2018-08-20 | End: 2018-08-22

## 2018-08-20 RX ADMIN — LABETALOL HYDROCHLORIDE 5 MG: 5 INJECTION, SOLUTION INTRAVENOUS at 08:16:00

## 2018-08-20 RX ADMIN — MIDAZOLAM HYDROCHLORIDE 2 MG: 1 INJECTION INTRAMUSCULAR; INTRAVENOUS at 07:33:00

## 2018-08-20 RX ADMIN — DEXAMETHASONE SODIUM PHOSPHATE 4 MG: 4 MG/ML VIAL (ML) INJECTION at 07:54:00

## 2018-08-20 RX ADMIN — SODIUM CHLORIDE, SODIUM LACTATE, POTASSIUM CHLORIDE, CALCIUM CHLORIDE: 600; 310; 30; 20 INJECTION, SOLUTION INTRAVENOUS at 07:33:00

## 2018-08-20 RX ADMIN — CEFAZOLIN SODIUM/WATER 2 G: 2 G/20 ML SYRINGE (ML) INTRAVENOUS at 07:49:00

## 2018-08-20 RX ADMIN — LABETALOL HYDROCHLORIDE 5 MG: 5 INJECTION, SOLUTION INTRAVENOUS at 08:33:00

## 2018-08-20 RX ADMIN — NEOSTIGMINE METHYLSULFATE 5 MG: 0.5 INJECTION INTRAVENOUS at 09:30:00

## 2018-08-20 RX ADMIN — LIDOCAINE HYDROCHLORIDE 40 MG: 10 INJECTION, SOLUTION EPIDURAL; INFILTRATION; INTRACAUDAL; PERINEURAL at 07:36:00

## 2018-08-20 RX ADMIN — ROCURONIUM BROMIDE 50 MG: 10 INJECTION, SOLUTION INTRAVENOUS at 07:37:00

## 2018-08-20 RX ADMIN — ONDANSETRON 4 MG: 2 INJECTION INTRAMUSCULAR; INTRAVENOUS at 07:54:00

## 2018-08-20 RX ADMIN — GLYCOPYRROLATE 0.8 MG: 0.2 INJECTION INTRAMUSCULAR; INTRAVENOUS at 09:30:00

## 2018-08-20 RX ADMIN — SODIUM CHLORIDE, SODIUM LACTATE, POTASSIUM CHLORIDE, CALCIUM CHLORIDE: 600; 310; 30; 20 INJECTION, SOLUTION INTRAVENOUS at 09:36:00

## 2018-08-20 RX ADMIN — SODIUM CHLORIDE: 9 INJECTION, SOLUTION INTRAVENOUS at 07:43:00

## 2018-08-20 NOTE — INTERVAL H&P NOTE
Pre-op Diagnosis: Uterine leiomyoma, unspecified location [D25.9]  Iron deficiency anemia, unspecified iron deficiency anemia type [D50.9]    The above referenced H&P was reviewed by Shad Jeffery MD on 8/20/2018, the patient was examined and no signific

## 2018-08-20 NOTE — ANESTHESIA PREPROCEDURE EVALUATION
Anesthesia PreOp Note    HPI:     Stephanie Duran is a 48year old female who presents for preoperative consultation requested by: Melecio Turner MD    Date of Surgery: 8/20/2018    Procedure(s):  ABDOMINAL HYSTERECTOMY  Indication: Uterine leiomyoma, un (MULTIVITAMIN ADULT OR) Take by mouth. Disp:  Rfl:  8/19/2018 at 1630   DILTIAZEM HCL ER COATED BEADS 240 MG Oral Capsule SR 24 Hr TAKE 1 CAPSULE (240 MG TOTAL) BY MOUTH ONCE DAILY.  Disp: 90 capsule Rfl: 1 8/19/2018 at 1630   Ferrous Sulfate 325 (65 FE) MG 08/14/2018   RDW 32.7 (H) 08/14/2018    08/14/2018   MPV 8.5 08/14/2018       Lab Results  Component Value Date    07/20/2018   K 4.0 07/20/2018    07/20/2018   CO2 25 07/20/2018   BUN 10 07/20/2018   CREATSERUM 0.69 07/20/2018   GLU 94 complications, and any alternative forms of anesthetic management. All of the patient's questions were answered to the best of my ability. The patient desires the anesthetic management as planned.   SHIRA RAMIREZ  8/20/2018 6:51 AM

## 2018-08-20 NOTE — OPERATIVE REPORT
Abdominal Hysterectomy Operative Note    Patient Name:   Royer Bar    Preoperative Diagnosis:  Symptomatic Uterine leiomyoma, anemia, paratubal cyst    Postoperative Diagnosis:  same    Primary Surgeon:   Queen Mayank MD     Assistant:    Belen Low uterine segment and cervix. Finger placed through posterior leaf of broad ligament close to ovarian peducle. The ovarian ligament was doubly clamped & cut.and suture ligated with 0-Vicryl. Good hemostasis was obtained. This was repeated on opposite side.  Yuliet Dobbins

## 2018-08-20 NOTE — PLAN OF CARE
Received gyne patient into room 351    Report given from PACU RN narciso. Vitals signs stable, IV right hand, left hand site unremarkable. Pain 4/10. bed in locked and low position. Patient transferred in to bed by Patient oriented to room and unit.   Call

## 2018-08-20 NOTE — ANESTHESIA POSTPROCEDURE EVALUATION
Patient: Gregor Conner    Procedure Summary     Date:  08/20/18 Room / Location:  [address] [place] [address] OR 09 / 300 [facility] OR    Anesthesia Start:  2550 Anesthesia Stop:      Procedure:  ABDOMINAL HYSTERECTOMY (Bilateral ) Diagnosis:       Uterine leiomyoma, unspecifi

## 2018-08-20 NOTE — ANESTHESIA PROCEDURE NOTES
Airway  Urgency: elective    Airway not difficult    General Information and Staff    Patient location during procedure: OR  Anesthesiologist: [facility]  Resident/CRNA: Josh Barnes  Performed: anesthesiologist and CRNA     Indications and Wylene Cuff

## 2018-08-21 RX ORDER — 0.9 % SODIUM CHLORIDE 0.9 %
VIAL (ML) INJECTION
Status: DISPENSED
Start: 2018-08-21 | End: 2018-08-21

## 2018-08-21 NOTE — PLAN OF CARE
Sat with patient to review plan of care. Discussed analgesic options. Answered all questions. VSS. Voided with assist. Dressing removed. Scant lochia.

## 2018-08-21 NOTE — PROGRESS NOTES
[facility] - [facility]    OB/GYNE Post-OP Progress Note      Carmela Mercado Patient Status:  Inpatient    1965 MRN [id number]   Location [facility] 3SE Attending Emili Casas MD   Hosp Day # 1 PCP Cong Mcrae MD     Date:

## 2018-08-22 ENCOUNTER — TELEPHONE (OUTPATIENT)
Dept: OBGYN CLINIC | Facility: CLINIC | Age: 53
End: 2018-08-22

## 2018-08-22 VITALS
OXYGEN SATURATION: 99 % | HEIGHT: 61 IN | BODY MASS INDEX: 33.07 KG/M2 | SYSTOLIC BLOOD PRESSURE: 135 MMHG | WEIGHT: 175.13 LBS | TEMPERATURE: 98 F | DIASTOLIC BLOOD PRESSURE: 68 MMHG | HEART RATE: 73 BPM | RESPIRATION RATE: 16 BRPM

## 2018-08-22 RX ORDER — IBUPROFEN 600 MG/1
600 TABLET ORAL EVERY 6 HOURS PRN
Status: DISCONTINUED | OUTPATIENT
Start: 2018-08-22 | End: 2018-08-22

## 2018-08-22 RX ORDER — ACETAMINOPHEN AND CODEINE PHOSPHATE 300; 30 MG/1; MG/1
1 TABLET ORAL EVERY 4 HOURS PRN
Status: DISCONTINUED | OUTPATIENT
Start: 2018-08-22 | End: 2018-08-22

## 2018-08-22 RX ORDER — ACETAMINOPHEN AND CODEINE PHOSPHATE 300; 30 MG/1; MG/1
1 TABLET ORAL EVERY 6 HOURS PRN
Qty: 30 TABLET | Refills: 0 | Status: SHIPPED | OUTPATIENT
Start: 2018-08-22 | End: 2020-07-07

## 2018-08-22 NOTE — PLAN OF CARE
Problem: RISK FOR INFECTION - ADULT  Goal: Absence of fever/infection during anticipated neutropenic period  INTERVENTIONS  - Monitor WBC  - Administer growth factors as ordered  - Implement neutropenic guidelines   Outcome: Progressing      Plan of care f

## 2018-08-22 NOTE — TELEPHONE ENCOUNTER
Pt filled hippa and form completion for FMLA. Pt would like FMLA forms faxed to [phone]. Pt paid $25 at .   Placing forms [facility]

## 2018-08-22 NOTE — PROGRESS NOTES
Paged Dr Theron Looney at [phone] to update her about patient's temperate. Temperature was taken at change of shift/bedside report.  Oral temperature at 1910 - 97.7 [id number] axillary temperate 99.9

## 2018-08-22 NOTE — DISCHARGE SUMMARY
[facility]    Discharge Summary    Trell Kruger Patient Status:  Inpatient    1965 MRN [id number]   Location [facility] 3SE Attending Keisha Alvarenga MD   Hosp Day # 2       Admit date:  2018    Discharge date:

## 2018-08-28 NOTE — TELEPHONE ENCOUNTER
Dr. Rosita Dawn    Please sign off on form:  -Highlight the patient and hit \"Chart\" button. -In Chart Review, w/in the Encounter tab - click 1 time on the Telephone call encounter for 8-22-18.  Scroll down the telephone encounter.  -Click \"scan on\" blue Hyper

## 2018-09-04 ENCOUNTER — TELEPHONE (OUTPATIENT)
Dept: OBGYN CLINIC | Facility: CLINIC | Age: 53
End: 2018-09-04

## 2018-09-04 NOTE — TELEPHONE ENCOUNTER
Pt would like to speak with the nurse regarding some forms.  Please advise and ask about some irregular bleeding

## 2018-09-04 NOTE — TELEPHONE ENCOUNTER
Pt informed of [facility] recs and verbalized understanding. Pt stated she will fax over new [employer] forms.

## 2018-09-04 NOTE — TELEPHONE ENCOUNTER
PT STILL HAS OCCASIONAL SPOTTING WITH WIPING. REASSURED HER THIS IS COMMON. ASKED TO CALL BACK IF SHE STARTS BLEEDING LIKE A PERIOD.   PT'S EMPLOYER APPROVED 5 WEEKS OFF INSTEAD OF 6 SO PT NEEDS TO GET FORMS FILLED OUT AGAIN TO BE CLEARED FOR THE ADDITION

## 2018-09-12 NOTE — TELEPHONE ENCOUNTER
PT STATES SHE LEFT MESSAGE FOR [employer] TODAY AND TUE AND MON. THEY HAVE NOT RETURNED HER CALL. I ASSURED HER I WILL SEND THIS MESSAGE TO THEM AS WELL. PT FEELS THE DATES ON HER PAPERWORK ARE NOT CORRECT.

## 2018-09-12 NOTE — TELEPHONE ENCOUNTER
Revised form faxed to [facility]. Original mailed to patient. Request complete. Patient notified.

## 2018-09-28 ENCOUNTER — OFFICE VISIT (OUTPATIENT)
Dept: OBGYN CLINIC | Facility: CLINIC | Age: 53
End: 2018-09-28
Payer: COMMERCIAL

## 2018-09-28 VITALS
WEIGHT: 168 LBS | SYSTOLIC BLOOD PRESSURE: 168 MMHG | BODY MASS INDEX: 32 KG/M2 | DIASTOLIC BLOOD PRESSURE: 92 MMHG | HEART RATE: 58 BPM

## 2018-09-28 DIAGNOSIS — N89.8 GRANULATION TISSUE AT VAGINAL VAULT: Primary | ICD-10-CM

## 2018-09-28 DIAGNOSIS — Z09 POSTOP CHECK: ICD-10-CM

## 2018-09-28 PROCEDURE — 99024 POSTOP FOLLOW-UP VISIT: CPT | Performed by: OBSTETRICS & GYNECOLOGY

## 2018-09-28 RX ORDER — METRONIDAZOLE 500 MG/1
500 TABLET ORAL 2 TIMES DAILY
Qty: 14 TABLET | Refills: 0 | Status: SHIPPED | OUTPATIENT
Start: 2018-09-28 | End: 2018-10-05

## 2018-10-02 PROBLEM — Z01.818 PREOPERATIVE CLEARANCE: Status: RESOLVED | Noted: 2018-08-05 | Resolved: 2018-10-02

## 2018-10-02 PROBLEM — D21.9 FIBROID: Status: RESOLVED | Noted: 2018-08-20 | Resolved: 2018-10-02

## 2018-10-02 PROBLEM — N89.8 GRANULATION TISSUE AT VAGINAL VAULT: Status: ACTIVE | Noted: 2018-10-02

## 2018-10-02 PROBLEM — D25.9 UTERINE LEIOMYOMA: Status: RESOLVED | Noted: 2018-08-05 | Resolved: 2018-10-02

## 2018-10-03 NOTE — PROGRESS NOTES
Reema Trujillo is a 48year old female [id number] Patient's last menstrual period was 2018. Patient presents with:  Gyn Exam: Post op -- spotting  .  S/p SOPHIA for fibroids    OBSTETRICS HISTORY:  Obstetric History     T2    L2    SAB0  TAB0 file    Social Needs      Financial resource strain: Not on file      Food insecurity - worry: Not on file      Food insecurity - inability: Not on file      Transportation needs - medical: Not on file      Transportation needs - non-medical: Not on file pain or palpitations  Respiratory:    denies shortness of breath  Gastrointestinal:   denies heartburn, abdominal pain, diarrhea or constipation  Genitourinary:    denies dysuria, incontinence, abnormal vaginal discharge, vaginal itching  Musculoskeletal: total) by mouth 2 (two) times daily for 7 days.        F/u 2 wks

## 2018-10-10 ENCOUNTER — OFFICE VISIT (OUTPATIENT)
Dept: OBGYN CLINIC | Facility: CLINIC | Age: 53
End: 2018-10-10
Payer: COMMERCIAL

## 2018-10-10 VITALS
SYSTOLIC BLOOD PRESSURE: 139 MMHG | HEART RATE: 65 BPM | WEIGHT: 170 LBS | DIASTOLIC BLOOD PRESSURE: 76 MMHG | BODY MASS INDEX: 32 KG/M2

## 2018-10-10 DIAGNOSIS — Z09 POSTOP CHECK: Primary | ICD-10-CM

## 2018-10-10 PROCEDURE — 99024 POSTOP FOLLOW-UP VISIT: CPT | Performed by: OBSTETRICS & GYNECOLOGY

## 2018-10-10 NOTE — PROGRESS NOTES
Ravi Rueda is a 48year old female [id number] Patient's last menstrual period was 2018. Patient presents with: Follow - Up: Surgerical f/u for SOPHIA on . Doing well  .     OBSTETRICS HISTORY:  Obstetric History     T2    L2    SAB0 Social Needs      Financial resource strain: Not on file      Food insecurity - worry: Not on file      Food insecurity - inability: Not on file      Transportation needs - medical: Not on file      Transportation needs - non-medical: Not on file    Occup pain.  Neurological:    denies headaches, extremity weakness or numbness. Psychiatric:   denies depression or anxiety.         PHYSICAL EXAM:   /76   Pulse 65   Wt 170 lb (77.1 kg)   LMP 07/31/2018   BMI 32.12 kg/m²   Constitutional:  well developed,

## 2018-10-19 RX ORDER — VALSARTAN 80 MG/1
TABLET ORAL
Qty: 90 TABLET | Refills: 0 | Status: SHIPPED | OUTPATIENT
Start: 2018-10-19 | End: 2019-03-15

## 2018-10-19 NOTE — TELEPHONE ENCOUNTER
Hypertensive Medications  Protocol Criteria:  · Appointment scheduled in the past 6 months or in the next 3 months  · BMP or CMP in the past 12 months  · Creatinine result < 2  Recent Outpatient Visits            1 week ago Postop check    [facility], [place] [facility]

## 2018-12-11 ENCOUNTER — TELEPHONE (OUTPATIENT)
Dept: INTERNAL MEDICINE CLINIC | Facility: CLINIC | Age: 53
End: 2018-12-11

## 2018-12-11 DIAGNOSIS — Z12.39 BREAST CANCER SCREENING: Primary | ICD-10-CM

## 2018-12-12 NOTE — TELEPHONE ENCOUNTER
Spoke with pt informed Mammo order generated by Dr. Jaison Lundberg and # given to call. Pt voiced understanding.

## 2018-12-29 ENCOUNTER — HOSPITAL ENCOUNTER (OUTPATIENT)
Dept: MAMMOGRAPHY | Age: 53
Discharge: HOME | End: 2018-12-29
Attending: INTERNAL MEDICINE
Payer: COMMERCIAL

## 2018-12-29 DIAGNOSIS — Z12.39 BREAST CANCER SCREENING: ICD-10-CM

## 2018-12-29 PROCEDURE — 77063 BREAST TOMOSYNTHESIS BI: CPT | Performed by: INTERNAL MEDICINE

## 2018-12-29 PROCEDURE — 77067 SCR MAMMO BI INCL CAD: CPT | Performed by: INTERNAL MEDICINE

## 2019-03-14 NOTE — TELEPHONE ENCOUNTER
90 day supply: request for authorization      Current Outpatient Medications:  valsartan (DIOVAN) 80 MG Oral Tab Take 1 tablet by mouth daily Disp: 90 tablet Rfl: 0

## 2019-03-17 RX ORDER — VALSARTAN 80 MG/1
TABLET ORAL
Qty: 90 TABLET | Refills: 0 | Status: SHIPPED | OUTPATIENT
Start: 2019-03-17 | End: 2019-06-23

## 2019-04-04 ENCOUNTER — HOSPITAL ENCOUNTER (OUTPATIENT)
Dept: CT IMAGING | Facility: HOSPITAL | Age: 54
Discharge: HOME | End: 2019-04-04
Attending: INTERNAL MEDICINE
Payer: COMMERCIAL

## 2019-04-04 ENCOUNTER — OFFICE VISIT (OUTPATIENT)
Dept: INTERNAL MEDICINE CLINIC | Facility: CLINIC | Age: 54
End: 2019-04-04
Payer: COMMERCIAL

## 2019-04-04 VITALS
HEIGHT: 62 IN | BODY MASS INDEX: 33.68 KG/M2 | TEMPERATURE: 99 F | HEART RATE: 82 BPM | WEIGHT: 183 LBS | SYSTOLIC BLOOD PRESSURE: 136 MMHG | DIASTOLIC BLOOD PRESSURE: 84 MMHG

## 2019-04-04 DIAGNOSIS — R10.12 LEFT UPPER QUADRANT PAIN: ICD-10-CM

## 2019-04-04 DIAGNOSIS — S46.912A STRAIN OF LEFT SHOULDER, INITIAL ENCOUNTER: ICD-10-CM

## 2019-04-04 DIAGNOSIS — S29.012A UPPER BACK STRAIN, INITIAL ENCOUNTER: ICD-10-CM
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
## 2019-04-04 PROCEDURE — 74177 CT ABD & PELVIS W/CONTRAST: CPT | Performed by: INTERNAL MEDICINE

## 2019-04-04 PROCEDURE — 82565 ASSAY OF CREATININE: CPT

## 2019-04-04 PROCEDURE — 99212 OFFICE O/P EST SF 10 MIN: CPT | Performed by: INTERNAL MEDICINE

## 2019-04-04 PROCEDURE — 99214 OFFICE O/P EST MOD 30 MIN: CPT | Performed by: INTERNAL MEDICINE

## 2019-04-04 RX ORDER — CYCLOBENZAPRINE HCL 10 MG
TABLET ORAL
Refills: 0 | COMMUNITY
Start: 2019-03-31 | End: 2022-01-13
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
## 2019-04-05 ENCOUNTER — TELEPHONE (OUTPATIENT)
Dept: INTERNAL MEDICINE CLINIC | Facility: CLINIC | Age: 54
End: 2019-04-05

## 2019-04-05 DIAGNOSIS — K43.9 SUPRAUMBILICAL HERNIA WITHOUT GANGRENE AND WITHOUT OBSTRUCTION: Primary | ICD-10-CM

## 2019-05-30 RX ORDER — DILTIAZEM HYDROCHLORIDE 240 MG/1
CAPSULE, COATED, EXTENDED RELEASE ORAL
Qty: 90 CAPSULE | Refills: 1 | Status: SHIPPED | OUTPATIENT
Start: 2019-05-30 | End: 2019-12-02

## 2019-05-30 NOTE — TELEPHONE ENCOUNTER
Pt called in stating that she is running low on medication below. She requested with pharmacy and they stated they were waiting on a response form us. Please advise.        Current Outpatient Medications:   •  DILTIAZEM HCL ER COATED BEADS 240 MG Oral C

## 2019-05-30 NOTE — TELEPHONE ENCOUNTER
RN=please call patient and clarify pharmacy location that she is using. ([facility]). Once verify, approve the refill. SCHEDit message sent. Refill passed per [facility] protocol.     Hypertensive Medications  Protocol Criteria:

## 2019-05-30 NOTE — TELEPHONE ENCOUNTER
Verified pt name and . Verified pharmacy information as requested, [facility] [address] [facility]. Prescription sent per protocol.

## 2019-06-24 RX ORDER — VALSARTAN 80 MG/1
TABLET ORAL
Qty: 90 TABLET | Refills: 1 | Status: SHIPPED | OUTPATIENT
Start: 2019-06-24 | End: 2020-04-09

## 2019-06-24 NOTE — TELEPHONE ENCOUNTER
Refill passed per [facility] protocol.   Hypertensive Medications  Protocol Criteria:  · Appointment scheduled in the past 6 months or in the next 3 months  · BMP or CMP in the past 12 months  · Creatinine result < 2  Recent Outpatient Visits

## 2019-12-03 RX ORDER — DILTIAZEM HYDROCHLORIDE 240 MG/1
CAPSULE, COATED, EXTENDED RELEASE ORAL
Qty: 90 CAPSULE | Refills: 0 | Status: SHIPPED | OUTPATIENT
Start: 2019-12-03 | End: 2020-03-03

## 2020-03-03 RX ORDER — DILTIAZEM HYDROCHLORIDE 240 MG/1
CAPSULE, COATED, EXTENDED RELEASE ORAL
Qty: 90 CAPSULE | Refills: 0 | Status: SHIPPED | OUTPATIENT
Start: 2020-03-03 | End: 2020-06-01

## 2020-04-09 RX ORDER — VALSARTAN 80 MG/1
TABLET ORAL
Qty: 90 TABLET | Refills: 0 | Status: SHIPPED | OUTPATIENT
Start: 2020-04-09 | End: 2020-07-06

## 2020-06-01 RX ORDER — DILTIAZEM HYDROCHLORIDE 240 MG/1
CAPSULE, COATED, EXTENDED RELEASE ORAL
Qty: 90 CAPSULE | Refills: 0 | Status: SHIPPED | OUTPATIENT
Start: 2020-06-01 | End: 2020-08-27

## 2020-06-11 PROBLEM — Z00.00 ENCOUNTER FOR PREVENTIVE HEALTH EXAMINATION: Status: ACTIVE | Noted: 2020-06-11

## 2020-06-30 ENCOUNTER — NURSE TRIAGE (OUTPATIENT)
Dept: INTERNAL MEDICINE CLINIC | Facility: CLINIC | Age: 55
End: 2020-06-30

## 2020-06-30 NOTE — TELEPHONE ENCOUNTER
Left message to call back     Also sent patient mychart message informing her to call office back. Provided office phone number and shortcut to reach a nurse.

## 2020-06-30 NOTE — TELEPHONE ENCOUNTER
Spoke with pt,  verified  Pt informed of  MD recommendation, pt stated understanding. Pt stated she will go [facility] in [place] and she will keep us inform.      CORTNEY

## 2020-06-30 NOTE — TELEPHONE ENCOUNTER
She does have ventral  Hernia and if she is having pain and hardness of the hernia, will need to make sure no bowel getting entrap inside hernia or encarceration.  This is urgent and will need to go to ER so they can scan her abdomen right away to rule this

## 2020-06-30 NOTE — TELEPHONE ENCOUNTER
Action Requested: Summary for Provider     []  Critical Lab, Recommendations Needed  [x] Need Additional Advice  []   FYI    []   Need Orders  [] Need Medications Sent to Pharmacy  []  Other     SUMMARY: Patient requesting appt for today, reports abdominal

## 2020-07-01 ENCOUNTER — OFFICE VISIT (OUTPATIENT)
Dept: INTERNAL MEDICINE CLINIC | Facility: CLINIC | Age: 55
End: 2020-07-01
Payer: COMMERCIAL

## 2020-07-01 VITALS
SYSTOLIC BLOOD PRESSURE: 126 MMHG | TEMPERATURE: 99 F | DIASTOLIC BLOOD PRESSURE: 76 MMHG | RESPIRATION RATE: 12 BRPM | HEIGHT: 62 IN | WEIGHT: 182 LBS | HEART RATE: 82 BPM | BODY MASS INDEX: 33.49 KG/M2

## 2020-07-01 DIAGNOSIS — K43.9 SUPRAUMBILICAL HERNIA: Primary | ICD-10-CM

## 2020-07-01 DIAGNOSIS — N83.201 RIGHT OVARIAN CYST: ICD-10-CM

## 2020-07-01 PROCEDURE — 99214 OFFICE O/P EST MOD 30 MIN: CPT | Performed by: INTERNAL MEDICINE

## 2020-07-01 NOTE — PROGRESS NOTES
HPI:    Patient ID: Earl Agarwal is a 50year old female. Abdominal Pain   This is a new problem. The current episode started yesterday. The onset quality is sudden. The problem occurs constantly. The problem has been unchanged.  The pain is located Oropharynx is clear and moist. No oropharyngeal exudate. Eyes: Pupils are equal, round, and reactive to light. Conjunctivae and EOM are normal. Right eye exhibits no discharge. Left eye exhibits no discharge. Neck: Normal range of motion. Neck supple. INTERNAL       DA#[id number]

## 2020-07-06 RX ORDER — VALSARTAN 80 MG/1
TABLET ORAL
Qty: 90 TABLET | Refills: 1 | Status: SHIPPED | OUTPATIENT
Start: 2020-07-06 | End: 2020-12-18

## 2020-07-09 ENCOUNTER — TELEPHONE (OUTPATIENT)
Dept: INTERNAL MEDICINE CLINIC | Facility: CLINIC | Age: 55
End: 2020-07-09

## 2020-07-09 DIAGNOSIS — Z12.31 BREAST CANCER SCREENING BY MAMMOGRAM: Primary | ICD-10-CM

## 2020-07-09 NOTE — TELEPHONE ENCOUNTER
Dr. Tatiana Womack, patient is requesting a referral to 12/29/2018.  Referral has been pended, please advise.     =====  CONCLUSION:       BI-RADS CATEGORY:     DIAGNOSTIC CATEGORY 2--BENIGN FINDING:       RECOMMENDATIONS:   ROUTINE SCREENING MAMMOGRAM AND CLIN

## 2020-07-10 ENCOUNTER — TELEPHONE (OUTPATIENT)
Dept: INTERNAL MEDICINE CLINIC | Facility: CLINIC | Age: 55
End: 2020-07-10

## 2020-07-10 DIAGNOSIS — Z12.11 COLON CANCER SCREENING: Primary | ICD-10-CM

## 2020-07-10 NOTE — TELEPHONE ENCOUNTER
Dr. Nathaly Edgar, patient is requesting a referral to [facility]. Referral has been pended, please advise. Dr. Tremayne Palafox recommends patient have a colonoscopy. Previous one was completed in 2013.

## 2020-07-11 ENCOUNTER — HOSPITAL ENCOUNTER (OUTPATIENT)
Dept: MAMMOGRAPHY | Facility: HOSPITAL | Age: 55
Discharge: HOME | End: 2020-07-11
Attending: INTERNAL MEDICINE
Payer: COMMERCIAL

## 2020-07-11 DIAGNOSIS — Z12.31 BREAST CANCER SCREENING BY MAMMOGRAM: ICD-10-CM

## 2020-07-11 PROCEDURE — 77067 SCR MAMMO BI INCL CAD: CPT | Performed by: INTERNAL MEDICINE

## 2020-07-11 PROCEDURE — 77063 BREAST TOMOSYNTHESIS BI: CPT | Performed by: INTERNAL MEDICINE

## 2020-07-28 ENCOUNTER — LAB ENCOUNTER (OUTPATIENT)
Dept: LAB | Facility: HOSPITAL | Age: 55
End: 2020-07-28
Attending: SURGERY
Payer: COMMERCIAL

## 2020-07-28 DIAGNOSIS — Z01.818 PRE-OP TESTING: ICD-10-CM

## 2020-07-28 LAB — SARS-COV-2 RNA RESP QL NAA+PROBE: NOT DETECTED

## 2020-07-29 ENCOUNTER — ANESTHESIA EVENT (OUTPATIENT)
Dept: SURGERY | Facility: HOSPITAL | Age: 55
End: 2020-07-29
Payer: COMMERCIAL

## 2020-07-29 ENCOUNTER — ANESTHESIA (OUTPATIENT)
Dept: SURGERY | Facility: HOSPITAL | Age: 55
End: 2020-07-29
Payer: COMMERCIAL

## 2020-07-29 ENCOUNTER — HOSPITAL ENCOUNTER (OUTPATIENT)
Facility: HOSPITAL | Age: 55
Setting detail: HOSPITAL OUTPATIENT SURGERY
Discharge: HOME | End: 2020-07-29
Attending: SURGERY | Admitting: SURGERY
Payer: COMMERCIAL

## 2020-07-29 VITALS
DIASTOLIC BLOOD PRESSURE: 67 MMHG | TEMPERATURE: 98 F | HEART RATE: 66 BPM | BODY MASS INDEX: 33.31 KG/M2 | HEIGHT: 62 IN | RESPIRATION RATE: 16 BRPM | WEIGHT: 181 LBS | OXYGEN SATURATION: 98 % | SYSTOLIC BLOOD PRESSURE: 139 MMHG

## 2020-07-29 DIAGNOSIS — Z01.818 PRE-OP TESTING: Primary | ICD-10-CM

## 2020-07-29 DIAGNOSIS — K43.9 VENTRAL HERNIA WITHOUT OBSTRUCTION OR GANGRENE: ICD-10-CM

## 2020-07-29 PROCEDURE — C9290 INJ, BUPIVACAINE LIPOSOME: HCPCS | Performed by: SURGERY

## 2020-07-29 PROCEDURE — 0WUF4JZ SUPPLEMENT ABDOMINAL WALL WITH SYNTHETIC SUBSTITUTE, PERCUTANEOUS ENDOSCOPIC APPROACH: ICD-10-PCS | Performed by: SURGERY

## 2020-07-29 PROCEDURE — 8E0W4CZ ROBOTIC ASSISTED PROCEDURE OF TRUNK REGION, PERCUTANEOUS ENDOSCOPIC APPROACH: ICD-10-PCS | Performed by: SURGERY

## 2020-07-29 DEVICE — SYNECOR SURG MESH OVAL 15CM: Type: IMPLANTABLE DEVICE | Site: ABDOMEN | Status: FUNCTIONAL

## 2020-07-29 RX ORDER — CEFAZOLIN SODIUM/WATER 2 G/20 ML
2 SYRINGE (ML) INTRAVENOUS ONCE
Status: COMPLETED | OUTPATIENT
Start: 2020-07-29 | End: 2020-07-29

## 2020-07-29 RX ORDER — MIDAZOLAM HYDROCHLORIDE 1 MG/ML
INJECTION INTRAMUSCULAR; INTRAVENOUS AS NEEDED
Status: DISCONTINUED | OUTPATIENT
Start: 2020-07-29 | End: 2020-07-29 | Stop reason: SURG

## 2020-07-29 RX ORDER — SODIUM CHLORIDE, SODIUM LACTATE, POTASSIUM CHLORIDE, CALCIUM CHLORIDE 600; 310; 30; 20 MG/100ML; MG/100ML; MG/100ML; MG/100ML
INJECTION, SOLUTION INTRAVENOUS CONTINUOUS
Status: DISCONTINUED | OUTPATIENT
Start: 2020-07-29 | End: 2020-07-29

## 2020-07-29 RX ORDER — PROCHLORPERAZINE EDISYLATE 5 MG/ML
5 INJECTION INTRAMUSCULAR; INTRAVENOUS ONCE AS NEEDED
Status: DISCONTINUED | OUTPATIENT
Start: 2020-07-29 | End: 2020-07-29

## 2020-07-29 RX ORDER — METOCLOPRAMIDE 10 MG/1
10 TABLET ORAL ONCE
Status: COMPLETED | OUTPATIENT
Start: 2020-07-29 | End: 2020-07-29

## 2020-07-29 RX ORDER — BUPIVACAINE HYDROCHLORIDE AND EPINEPHRINE 2.5; 5 MG/ML; UG/ML
INJECTION, SOLUTION INFILTRATION; PERINEURAL AS NEEDED
Status: DISCONTINUED | OUTPATIENT
Start: 2020-07-29 | End: 2020-07-29

## 2020-07-29 RX ORDER — ROCURONIUM BROMIDE 10 MG/ML
INJECTION, SOLUTION INTRAVENOUS AS NEEDED
Status: DISCONTINUED | OUTPATIENT
Start: 2020-07-29 | End: 2020-07-29 | Stop reason: SURG

## 2020-07-29 RX ORDER — DEXAMETHASONE SODIUM PHOSPHATE 4 MG/ML
VIAL (ML) INJECTION AS NEEDED
Status: DISCONTINUED | OUTPATIENT
Start: 2020-07-29 | End: 2020-07-29 | Stop reason: SURG

## 2020-07-29 RX ORDER — FAMOTIDINE 20 MG/1
20 TABLET ORAL ONCE
Status: COMPLETED | OUTPATIENT
Start: 2020-07-29 | End: 2020-07-29

## 2020-07-29 RX ORDER — NALOXONE HYDROCHLORIDE 0.4 MG/ML
80 INJECTION, SOLUTION INTRAMUSCULAR; INTRAVENOUS; SUBCUTANEOUS AS NEEDED
Status: DISCONTINUED | OUTPATIENT
Start: 2020-07-29 | End: 2020-07-29

## 2020-07-29 RX ORDER — TRAMADOL HYDROCHLORIDE 50 MG/1
50 TABLET ORAL EVERY 6 HOURS PRN
Qty: 20 TABLET | Refills: 0 | Status: SHIPPED | OUTPATIENT
Start: 2020-07-29 | End: 2020-08-19

## 2020-07-29 RX ORDER — ACETAMINOPHEN 500 MG
1000 TABLET ORAL ONCE
Status: COMPLETED | OUTPATIENT
Start: 2020-07-29 | End: 2020-07-29

## 2020-07-29 RX ORDER — ONDANSETRON 2 MG/ML
4 INJECTION INTRAMUSCULAR; INTRAVENOUS ONCE AS NEEDED
Status: DISCONTINUED | OUTPATIENT
Start: 2020-07-29 | End: 2020-07-29

## 2020-07-29 RX ORDER — ONDANSETRON 2 MG/ML
INJECTION INTRAMUSCULAR; INTRAVENOUS AS NEEDED
Status: DISCONTINUED | OUTPATIENT
Start: 2020-07-29 | End: 2020-07-29 | Stop reason: SURG

## 2020-07-29 RX ADMIN — ROCURONIUM BROMIDE 50 MG: 10 INJECTION, SOLUTION INTRAVENOUS at 07:45:00

## 2020-07-29 RX ADMIN — ROCURONIUM BROMIDE 10 MG: 10 INJECTION, SOLUTION INTRAVENOUS at 08:53:00

## 2020-07-29 RX ADMIN — MIDAZOLAM HYDROCHLORIDE 2 MG: 1 INJECTION INTRAMUSCULAR; INTRAVENOUS at 07:33:00

## 2020-07-29 RX ADMIN — CEFAZOLIN SODIUM/WATER 2 G: 2 G/20 ML SYRINGE (ML) INTRAVENOUS at 07:40:00

## 2020-07-29 RX ADMIN — DEXAMETHASONE SODIUM PHOSPHATE 4 MG: 4 MG/ML VIAL (ML) INJECTION at 07:40:00

## 2020-07-29 RX ADMIN — ONDANSETRON 4 MG: 2 INJECTION INTRAMUSCULAR; INTRAVENOUS at 09:56:00

## 2020-07-29 RX ADMIN — SODIUM CHLORIDE, SODIUM LACTATE, POTASSIUM CHLORIDE, CALCIUM CHLORIDE: 600; 310; 30; 20 INJECTION, SOLUTION INTRAVENOUS at 10:13:00

## 2020-07-29 NOTE — OPERATIVE REPORT
OPERATIVE REPORT:     PATIENT NAME: Valeriy Robison  : 1965   MRN: [id number]    DATE OF OPERATION:   20    PREOPERATIVE DIAGNOSIS: Ventral Hernia     POSTOPERATIVE DIAGNOSIS: Ventral and Umbilical Hernia     PROCEDURE PERFORMED: Robotic-herve condition.     Eber Chapa MD

## 2020-07-29 NOTE — ANESTHESIA PROCEDURE NOTES
Airway  Urgency: Elective      General Information and Staff    Patient location during procedure: OR  Anesthesiologist: Jonathon Flores MD  Performed: anesthesiologist     Indications and Patient Condition  Indications for airway management: anesthesia  Se

## 2020-07-29 NOTE — H&P
Abena Hewitt is a 50year old female.      HPI:       The patient presents for evaluation of hernia. Signs and symptoms were first noticed 1 year ago.    Symptoms include bulge and pain with hardness - seen last week in ER but softer and less painful stomach ulcers     • Iron deficiency anemia     • Knee pain, bilateral 2007   • Nocturia 2004     Detrol x 3 months    • Patellofemoral syndrome 2007   • PONV (postoperative nausea and vomiting)     • Pregnancy 1994, 1996, 1992    • SAB (spontaneous aborti auscultation  CARDIOVASCULAR: S1, S2 normal, RRR; no S3, no S4; no click; no murmur  ABDOMEN: soft, nontender; no HSM; no masses; moderate and reducible supraumbilical ventral hernia present; no evidence of left inguinal hernia and right inguinal hernia  G

## 2020-07-29 NOTE — ANESTHESIA PREPROCEDURE EVALUATION
Anesthesia PreOp Note    HPI:     Lindsay Nieves is a 50year old female who presents for preoperative consultation requested by: Everett Pickett MD    Date of Surgery: 7/29/2020    Procedure(s):  XI ROBOT-ASSISTED LAPAROSCOPIC VENTRAL HERNIA REPAIR MAIN OR   • COLONOSCOPY     •      •      • REDUCTION LEFT     • REDUCTION OF LARGE BREAST     • REDUCTION RIGHT     • TONSILLECTOMY     • TUBAL LIGATION Bilateral        VALSARTAN 80 MG Oral Tab, TAKE 1 TABLET BY MOUTH EVERY DA Tubal Ligation    Lifestyle      Physical activity:        Days per week: Not on file        Minutes per session: Not on file      Stress: Not on file    Relationships      Social connections:        Talks on phone: Not on file        Gets together: Not on hypertension,     Neuro/Psych    (+)  neuromuscular disease,       GI/Hepatic/Renal      Endo/Other    Abdominal   (+) obese,                Anesthesia Plan:   ASA:  2  Plan:   General  Airway:  ETT  Informed Consent Plan and Risks Discussed With:  Patient

## 2020-07-29 NOTE — DISCHARGE SUMMARY
Outpatient Surgery Brief Discharge Summary         Patient ID:  Bala Gavin  [id number]  50year old  6/1/1965    Discharge Diagnoses: Ventral hernia without obstruction or gangrene [K43.9]    Procedures: Robotic assisted ventral hernia repair and umb constipation, take an over the counter stool softener such as Colace. (Prescription pain medication - Tramadol- can be constipating).  If you continue to have issues with bowel movements please contact the office.      No alcohol or driving while taking pre received for sedation or general anesthesia can last up to 24 hours.  Your judgment and reflexes may be altered, even if you feel like your normal self.       We Recommend:   · Do not drive any motor vehicle or bicycle   · Avoid mowing the lawn, playing spo for a visit  For follow up in 2 weeks, call to schedule apt  [facility]  [phone]              Discharge Medications      START taking these medications      Instructions Prescription details   traMADol HCl 50 MG Tabs

## 2020-08-27 RX ORDER — DILTIAZEM HYDROCHLORIDE 240 MG/1
CAPSULE, COATED, EXTENDED RELEASE ORAL
Qty: 90 CAPSULE | Refills: 1 | Status: SHIPPED | OUTPATIENT
Start: 2020-08-27 | End: 2021-02-23

## 2020-12-18 RX ORDER — VALSARTAN 80 MG/1
TABLET ORAL
Qty: 90 TABLET | Refills: 0 | Status: SHIPPED | OUTPATIENT
Start: 2020-12-18 | End: 2021-03-23

## 2021-02-23 RX ORDER — DILTIAZEM HYDROCHLORIDE 240 MG/1
CAPSULE, COATED, EXTENDED RELEASE ORAL
Qty: 90 CAPSULE | Refills: 0 | Status: SHIPPED | OUTPATIENT
Start: 2021-02-23 | End: 2021-10-10

## 2021-03-15 ENCOUNTER — OFFICE VISIT (OUTPATIENT)
Dept: INTERNAL MEDICINE CLINIC | Facility: CLINIC | Age: 56
End: 2021-03-15
Payer: COMMERCIAL

## 2021-03-15 VITALS
RESPIRATION RATE: 12 BRPM | WEIGHT: 179 LBS | HEIGHT: 62 IN | TEMPERATURE: 97 F | SYSTOLIC BLOOD PRESSURE: 126 MMHG | DIASTOLIC BLOOD PRESSURE: 80 MMHG | BODY MASS INDEX: 32.94 KG/M2 | HEART RATE: 60 BPM

## 2021-03-15 DIAGNOSIS — Z00.00 ANNUAL PHYSICAL EXAM: Primary | ICD-10-CM

## 2021-03-15 DIAGNOSIS — N83.201 RIGHT OVARIAN CYST: ICD-10-CM

## 2021-03-15 DIAGNOSIS — I10 ESSENTIAL HYPERTENSION: ICD-10-CM

## 2021-03-15 DIAGNOSIS — Z86.010 HISTORY OF COLON POLYPS: ICD-10-CM

## 2021-03-15 PROBLEM — Z86.0100 HISTORY OF COLON POLYPS: Status: ACTIVE | Noted: 2021-03-15

## 2021-03-15 PROCEDURE — 3008F BODY MASS INDEX DOCD: CPT | Performed by: INTERNAL MEDICINE

## 2021-03-15 PROCEDURE — 99396 PREV VISIT EST AGE 40-64: CPT | Performed by: INTERNAL MEDICINE

## 2021-03-15 PROCEDURE — 3079F DIAST BP 80-89 MM HG: CPT | Performed by: INTERNAL MEDICINE

## 2021-03-15 PROCEDURE — 3074F SYST BP LT 130 MM HG: CPT | Performed by: INTERNAL MEDICINE

## 2021-03-23 RX ORDER — VALSARTAN 80 MG/1
TABLET ORAL
Qty: 90 TABLET | Refills: 1 | Status: SHIPPED | OUTPATIENT
Start: 2021-03-23 | End: 2021-12-27

## 2021-05-13 ENCOUNTER — MED REC SCAN ONLY (OUTPATIENT)
Dept: INTERNAL MEDICINE CLINIC | Facility: CLINIC | Age: 56
End: 2021-05-13

## 2021-09-01 ENCOUNTER — TELEPHONE (OUTPATIENT)
Dept: INTERNAL MEDICINE CLINIC | Facility: CLINIC | Age: 56
End: 2021-09-01

## 2021-09-01 DIAGNOSIS — Z12.31 ENCOUNTER FOR SCREENING MAMMOGRAM FOR MALIGNANT NEOPLASM OF BREAST: Primary | ICD-10-CM

## 2021-09-01 NOTE — TELEPHONE ENCOUNTER
Patient advised of mammogram order and stated that just scheduled it for 9/28/2021 on [facility].

## 2021-09-28 ENCOUNTER — HOSPITAL ENCOUNTER (OUTPATIENT)
Dept: MAMMOGRAPHY | Age: 56
Discharge: HOME | End: 2021-09-28
Attending: INTERNAL MEDICINE
Payer: COMMERCIAL

## 2021-09-28 DIAGNOSIS — Z12.31 ENCOUNTER FOR SCREENING MAMMOGRAM FOR MALIGNANT NEOPLASM OF BREAST: ICD-10-CM

## 2021-09-28 PROCEDURE — 77067 SCR MAMMO BI INCL CAD: CPT | Performed by: INTERNAL MEDICINE

## 2021-09-28 PROCEDURE — 77063 BREAST TOMOSYNTHESIS BI: CPT | Performed by: INTERNAL MEDICINE

## 2021-10-10 RX ORDER — DILTIAZEM HYDROCHLORIDE 240 MG/1
CAPSULE, COATED, EXTENDED RELEASE ORAL
Qty: 90 CAPSULE | Refills: 0 | Status: SHIPPED | OUTPATIENT
Start: 2021-10-10 | End: 2022-01-07

## 2021-12-27 RX ORDER — VALSARTAN 80 MG/1
TABLET ORAL
Qty: 90 TABLET | Refills: 0 | Status: SHIPPED | OUTPATIENT
Start: 2021-12-27

## 2022-01-07 RX ORDER — DILTIAZEM HYDROCHLORIDE 240 MG/1
CAPSULE, COATED, EXTENDED RELEASE ORAL
Qty: 90 CAPSULE | Refills: 0 | Status: SHIPPED | OUTPATIENT
Start: 2022-01-07

## 2022-01-13 ENCOUNTER — OFFICE VISIT (OUTPATIENT)
Dept: INTERNAL MEDICINE CLINIC | Facility: CLINIC | Age: 57
End: 2022-01-13
Payer: COMMERCIAL

## 2022-01-13 VITALS
HEART RATE: 64 BPM | WEIGHT: 180.88 LBS | HEIGHT: 62 IN | OXYGEN SATURATION: 96 % | TEMPERATURE: 98 F | SYSTOLIC BLOOD PRESSURE: 124 MMHG | DIASTOLIC BLOOD PRESSURE: 76 MMHG | BODY MASS INDEX: 33.29 KG/M2

## 2022-01-13 DIAGNOSIS — Z86.010 HISTORY OF COLON POLYPS: ICD-10-CM

## 2022-01-13 DIAGNOSIS — N83.201 RIGHT OVARIAN CYST: ICD-10-CM

## 2022-01-13 DIAGNOSIS — I10 ESSENTIAL HYPERTENSION: Primary | ICD-10-CM

## 2022-01-13 DIAGNOSIS — R04.0 EPISTAXIS: ICD-10-CM

## 2022-01-13 PROCEDURE — 3074F SYST BP LT 130 MM HG: CPT | Performed by: INTERNAL MEDICINE

## 2022-01-13 PROCEDURE — 3078F DIAST BP <80 MM HG: CPT | Performed by: INTERNAL MEDICINE

## 2022-01-13 PROCEDURE — 3008F BODY MASS INDEX DOCD: CPT | Performed by: INTERNAL MEDICINE

## 2022-01-13 PROCEDURE — 99214 OFFICE O/P EST MOD 30 MIN: CPT | Performed by: INTERNAL MEDICINE

## 2022-01-13 RX ORDER — MELOXICAM 15 MG/1
TABLET ORAL
COMMUNITY
Start: 2021-12-28

## 2022-01-13 NOTE — PROGRESS NOTES
Subjective:     Patient ID: Azeem Gibbons is a 49year old female. Hypertension  This is a chronic problem. The current episode started more than 1 year ago. The problem has been gradually improving since onset. The problem is controlled.  Pertinent n impairment     glaases      Past Surgical History:   Procedure Laterality Date   • COLONOSCOPY     •      •      • REDUCTION LEFT     • REDUCTION OF LARGE BREAST     • REDUCTION RIGHT     • TONSILLECTOMY     • TOTAL ABDOMINAL HYSTER rebound. Musculoskeletal:         General: No tenderness. Cervical back: Normal range of motion and neck supple. Right lower leg: No edema. Left lower leg: No edema. Lymphadenopathy:      Cervical: No cervical adenopathy.    Skin:     Gen

## 2022-01-27 ENCOUNTER — TELEPHONE (OUTPATIENT)
Dept: INTERNAL MEDICINE CLINIC | Facility: CLINIC | Age: 57
End: 2022-01-27

## 2022-01-27 NOTE — TELEPHONE ENCOUNTER
Verified name and  of patient. Reviewed with patient the dr recommendation to review with the gynecologist on the supplements of black cohosh or estroven. Pt indicated she will reach out to her gyne.

## 2022-01-27 NOTE — TELEPHONE ENCOUNTER
Patient requesting medication for post menopausal symptoms, asking if she can take otc black cohosh, or otc Estroven, please advise.

## 2022-03-14 ENCOUNTER — OFFICE VISIT (OUTPATIENT)
Dept: OBGYN CLINIC | Facility: CLINIC | Age: 57
End: 2022-03-14
Payer: COMMERCIAL

## 2022-03-14 VITALS
BODY MASS INDEX: 34.04 KG/M2 | WEIGHT: 185 LBS | SYSTOLIC BLOOD PRESSURE: 152 MMHG | DIASTOLIC BLOOD PRESSURE: 80 MMHG | HEART RATE: 69 BPM | HEIGHT: 61.7 IN

## 2022-03-14 DIAGNOSIS — Z01.419 ENCOUNTER FOR GYNECOLOGICAL EXAMINATION WITHOUT ABNORMAL FINDING: Primary | ICD-10-CM

## 2022-03-14 DIAGNOSIS — N83.209 CYST OF OVARY, UNSPECIFIED LATERALITY: ICD-10-CM

## 2022-03-14 PROBLEM — Z00.00 ANNUAL PHYSICAL EXAM: Status: RESOLVED | Noted: 2021-03-15 | Resolved: 2022-03-14

## 2022-03-14 PROCEDURE — 3079F DIAST BP 80-89 MM HG: CPT | Performed by: OBSTETRICS & GYNECOLOGY

## 2022-03-14 PROCEDURE — 3008F BODY MASS INDEX DOCD: CPT | Performed by: OBSTETRICS & GYNECOLOGY

## 2022-03-14 PROCEDURE — 3077F SYST BP >= 140 MM HG: CPT | Performed by: OBSTETRICS & GYNECOLOGY

## 2022-03-14 PROCEDURE — 99386 PREV VISIT NEW AGE 40-64: CPT | Performed by: OBSTETRICS & GYNECOLOGY

## 2022-03-22 RX ORDER — VALSARTAN 80 MG/1
TABLET ORAL
Qty: 90 TABLET | Refills: 0 | Status: SHIPPED | OUTPATIENT
Start: 2022-03-22

## 2022-03-28 ENCOUNTER — OFFICE VISIT (OUTPATIENT)
Dept: INTERNAL MEDICINE CLINIC | Facility: CLINIC | Age: 57
End: 2022-03-28
Payer: COMMERCIAL

## 2022-03-28 ENCOUNTER — NURSE TRIAGE (OUTPATIENT)
Dept: INTERNAL MEDICINE CLINIC | Facility: CLINIC | Age: 57
End: 2022-03-28

## 2022-03-28 VITALS
BODY MASS INDEX: 34.36 KG/M2 | TEMPERATURE: 99 F | HEART RATE: 71 BPM | SYSTOLIC BLOOD PRESSURE: 128 MMHG | DIASTOLIC BLOOD PRESSURE: 78 MMHG | RESPIRATION RATE: 12 BRPM | HEIGHT: 61 IN | WEIGHT: 182 LBS

## 2022-03-28 DIAGNOSIS — J01.90 ACUTE SINUSITIS, RECURRENCE NOT SPECIFIED, UNSPECIFIED LOCATION: Primary | ICD-10-CM

## 2022-03-28 PROCEDURE — 99214 OFFICE O/P EST MOD 30 MIN: CPT | Performed by: INTERNAL MEDICINE

## 2022-03-28 PROCEDURE — 3078F DIAST BP <80 MM HG: CPT | Performed by: INTERNAL MEDICINE

## 2022-03-28 PROCEDURE — 3074F SYST BP LT 130 MM HG: CPT | Performed by: INTERNAL MEDICINE

## 2022-03-28 PROCEDURE — 3008F BODY MASS INDEX DOCD: CPT | Performed by: INTERNAL MEDICINE

## 2022-03-28 RX ORDER — FLUTICASONE PROPIONATE 50 MCG
2 SPRAY, SUSPENSION (ML) NASAL DAILY
Qty: 1 EACH | Refills: 0 | Status: SHIPPED | OUTPATIENT
Start: 2022-03-28 | End: 2022-04-27

## 2022-03-28 RX ORDER — AMOXICILLIN 875 MG/1
875 TABLET, COATED ORAL 2 TIMES DAILY
Qty: 14 TABLET | Refills: 0 | Status: SHIPPED | OUTPATIENT
Start: 2022-03-28

## 2022-03-28 NOTE — TELEPHONE ENCOUNTER
Pt was called and apt as made for today at 3:30    Future Appointments   Date Time Provider Faina Patel   3/28/2022  3:30 PM Benedicto Perez MD [facility] ADO   4/18/2022  1:00 PM ADO Berhane Cohen

## 2022-04-19 RX ORDER — FLUTICASONE PROPIONATE 50 MCG
2 SPRAY, SUSPENSION (ML) NASAL DAILY
Qty: 16 ML | Refills: 2 | Status: SHIPPED | OUTPATIENT
Start: 2022-04-19

## 2022-04-19 NOTE — TELEPHONE ENCOUNTER
Refill passed per [place] [facility] protocol.   Requested Prescriptions   Pending Prescriptions Disp Refills    FLUTICASONE PROPIONATE 50 MCG/ACT Nasal Suspension [Pharmacy Med Name: FLUTICASONE PROP 50 MCG SPRAY] 16 mL 0     Sig: SPRAY 2 SPRAYS INTO EACH NOSTRIL EVERY DAY        Allergy Medication Protocol Passed - 4/19/2022  8:31 AM        Passed - Appoinment in past 12 or next 3 months            Recent Outpatient Visits              3 weeks ago Acute sinusitis, recurrence not specified, unspecified location    [facility], [facility], [address]Ros[address] MD    Office Visit    1 month ago Encounter for gynecological examination without abnormal finding    [facility] Annie Dc [facility]    Office Visit    3 months ago Essential hypertension    Rosibel Ramey MD    Office Visit    1 year ago Annual physical exam    Dimitri Agarwal MD    Office Visit    1 year ago Ventral hernia without obstruction or gangrene    Surgery - Ish Rosenberg, Seth Mancera MD    Office Visit

## 2022-04-21 ENCOUNTER — MED REC SCAN ONLY (OUTPATIENT)
Dept: INTERNAL MEDICINE CLINIC | Facility: CLINIC | Age: 57
End: 2022-04-21

## 2022-05-03 ENCOUNTER — HOSPITAL ENCOUNTER (OUTPATIENT)
Dept: GENERAL RADIOLOGY | Age: 57
Discharge: HOME | End: 2022-05-03
Attending: INTERNAL MEDICINE
Payer: COMMERCIAL

## 2022-05-03 ENCOUNTER — OFFICE VISIT (OUTPATIENT)
Dept: INTERNAL MEDICINE CLINIC | Facility: CLINIC | Age: 57
End: 2022-05-03

## 2022-05-03 VITALS
DIASTOLIC BLOOD PRESSURE: 78 MMHG | HEIGHT: 60 IN | RESPIRATION RATE: 12 BRPM | BODY MASS INDEX: 36.32 KG/M2 | WEIGHT: 185 LBS | SYSTOLIC BLOOD PRESSURE: 124 MMHG | HEART RATE: 80 BPM

## 2022-05-03 DIAGNOSIS — M54.2 NECK PAIN: Primary | ICD-10-CM

## 2022-05-03 DIAGNOSIS — M25.552 ACUTE HIP PAIN, LEFT: ICD-10-CM

## 2022-05-03 DIAGNOSIS — M54.42 ACUTE LEFT-SIDED LOW BACK PAIN WITH LEFT-SIDED SCIATICA: ICD-10-CM

## 2022-05-03 PROCEDURE — 99214 OFFICE O/P EST MOD 30 MIN: CPT | Performed by: INTERNAL MEDICINE

## 2022-05-03 PROCEDURE — 73502 X-RAY EXAM HIP UNI 2-3 VIEWS: CPT | Performed by: INTERNAL MEDICINE

## 2022-05-03 PROCEDURE — 3074F SYST BP LT 130 MM HG: CPT | Performed by: INTERNAL MEDICINE

## 2022-05-03 PROCEDURE — 3008F BODY MASS INDEX DOCD: CPT | Performed by: INTERNAL MEDICINE

## 2022-05-03 PROCEDURE — 3078F DIAST BP <80 MM HG: CPT | Performed by: INTERNAL MEDICINE

## 2022-05-03 RX ORDER — CYCLOBENZAPRINE HCL 10 MG
10 TABLET ORAL AS NEEDED
COMMUNITY
Start: 2022-04-18

## 2022-05-05 ENCOUNTER — TELEPHONE (OUTPATIENT)
Dept: PHYSICAL THERAPY | Facility: HOSPITAL | Age: 57
End: 2022-05-05

## 2022-05-09 ENCOUNTER — TELEPHONE (OUTPATIENT)
Dept: PHYSICAL THERAPY | Facility: HOSPITAL | Age: 57
End: 2022-05-09

## 2022-05-23 RX ORDER — DILTIAZEM HYDROCHLORIDE 240 MG/1
CAPSULE, COATED, EXTENDED RELEASE ORAL
Qty: 90 CAPSULE | Refills: 0 | Status: SHIPPED | OUTPATIENT
Start: 2022-05-23

## 2022-05-26 ENCOUNTER — TELEPHONE (OUTPATIENT)
Dept: PHYSICAL THERAPY | Facility: HOSPITAL | Age: 57
End: 2022-05-26

## 2022-06-06 ENCOUNTER — TELEPHONE (OUTPATIENT)
Dept: PHYSICAL THERAPY | Facility: HOSPITAL | Age: 57
End: 2022-06-06

## 2022-06-07 ENCOUNTER — APPOINTMENT (OUTPATIENT)
Dept: PHYSICAL THERAPY | Age: 57
End: 2022-06-07
Attending: INTERNAL MEDICINE
Payer: COMMERCIAL

## 2022-06-09 ENCOUNTER — OFFICE VISIT (OUTPATIENT)
Dept: PHYSICAL THERAPY | Age: 57
End: 2022-06-09
Attending: INTERNAL MEDICINE
Payer: COMMERCIAL

## 2022-06-09 PROCEDURE — 97162 PT EVAL MOD COMPLEX 30 MIN: CPT

## 2022-06-09 PROCEDURE — 97110 THERAPEUTIC EXERCISES: CPT

## 2022-06-13 ENCOUNTER — OFFICE VISIT (OUTPATIENT)
Dept: PHYSICAL THERAPY | Age: 57
End: 2022-06-13
Attending: INTERNAL MEDICINE
Payer: COMMERCIAL

## 2022-06-13 PROCEDURE — 97110 THERAPEUTIC EXERCISES: CPT

## 2022-06-13 PROCEDURE — 97140 MANUAL THERAPY 1/> REGIONS: CPT

## 2022-06-20 RX ORDER — VALSARTAN 80 MG/1
TABLET ORAL
Qty: 90 TABLET | Refills: 1 | Status: SHIPPED | OUTPATIENT
Start: 2022-06-20

## 2022-06-22 ENCOUNTER — OFFICE VISIT (OUTPATIENT)
Dept: PHYSICAL THERAPY | Age: 57
End: 2022-06-22
Attending: INTERNAL MEDICINE
Payer: COMMERCIAL

## 2022-06-22 PROCEDURE — 97110 THERAPEUTIC EXERCISES: CPT

## 2022-06-22 PROCEDURE — 97140 MANUAL THERAPY 1/> REGIONS: CPT

## 2022-06-28 ENCOUNTER — OFFICE VISIT (OUTPATIENT)
Dept: PHYSICAL THERAPY | Age: 57
End: 2022-06-28
Attending: INTERNAL MEDICINE
Payer: COMMERCIAL

## 2022-06-28 PROCEDURE — 97140 MANUAL THERAPY 1/> REGIONS: CPT

## 2022-06-28 PROCEDURE — 97110 THERAPEUTIC EXERCISES: CPT

## 2022-06-30 ENCOUNTER — OFFICE VISIT (OUTPATIENT)
Dept: PHYSICAL THERAPY | Age: 57
End: 2022-06-30
Attending: INTERNAL MEDICINE
Payer: COMMERCIAL

## 2022-06-30 PROCEDURE — 97110 THERAPEUTIC EXERCISES: CPT

## 2022-06-30 PROCEDURE — 97140 MANUAL THERAPY 1/> REGIONS: CPT

## 2022-07-05 ENCOUNTER — OFFICE VISIT (OUTPATIENT)
Dept: PHYSICAL THERAPY | Age: 57
End: 2022-07-05
Attending: INTERNAL MEDICINE
Payer: COMMERCIAL

## 2022-07-05 PROCEDURE — 97110 THERAPEUTIC EXERCISES: CPT

## 2022-07-07 ENCOUNTER — OFFICE VISIT (OUTPATIENT)
Dept: PHYSICAL THERAPY | Age: 57
End: 2022-07-07
Attending: INTERNAL MEDICINE
Payer: COMMERCIAL

## 2022-07-07 PROCEDURE — 97110 THERAPEUTIC EXERCISES: CPT

## 2022-07-07 PROCEDURE — 97140 MANUAL THERAPY 1/> REGIONS: CPT

## 2022-07-13 ENCOUNTER — OFFICE VISIT (OUTPATIENT)
Dept: PHYSICAL THERAPY | Age: 57
End: 2022-07-13
Payer: COMMERCIAL

## 2022-07-13 PROCEDURE — 97110 THERAPEUTIC EXERCISES: CPT

## 2022-07-13 PROCEDURE — 97140 MANUAL THERAPY 1/> REGIONS: CPT

## 2022-07-20 ENCOUNTER — OFFICE VISIT (OUTPATIENT)
Dept: PHYSICAL THERAPY | Age: 57
End: 2022-07-20
Payer: COMMERCIAL

## 2022-07-20 PROCEDURE — 97110 THERAPEUTIC EXERCISES: CPT

## 2022-07-20 PROCEDURE — 97140 MANUAL THERAPY 1/> REGIONS: CPT

## 2022-07-21 ENCOUNTER — APPOINTMENT (OUTPATIENT)
Dept: PHYSICAL THERAPY | Age: 57
End: 2022-07-21
Payer: COMMERCIAL

## 2022-07-25 ENCOUNTER — TELEPHONE (OUTPATIENT)
Dept: PHYSICAL THERAPY | Facility: HOSPITAL | Age: 57
End: 2022-07-25

## 2022-07-27 ENCOUNTER — OFFICE VISIT (OUTPATIENT)
Dept: PHYSICAL THERAPY | Age: 57
End: 2022-07-27
Payer: COMMERCIAL

## 2022-07-27 PROCEDURE — 97110 THERAPEUTIC EXERCISES: CPT

## 2022-07-27 PROCEDURE — 97140 MANUAL THERAPY 1/> REGIONS: CPT

## 2022-08-05 RX ORDER — DILTIAZEM HYDROCHLORIDE 240 MG/1
CAPSULE, COATED, EXTENDED RELEASE ORAL
Qty: 90 CAPSULE | Refills: 0 | Status: SHIPPED | OUTPATIENT
Start: 2022-08-05

## 2022-11-26 ENCOUNTER — OFFICE VISIT (OUTPATIENT)
Dept: INTERNAL MEDICINE CLINIC | Facility: CLINIC | Age: 57
End: 2022-11-26
Payer: COMMERCIAL

## 2022-11-26 VITALS
OXYGEN SATURATION: 98 % | WEIGHT: 177.88 LBS | DIASTOLIC BLOOD PRESSURE: 82 MMHG | TEMPERATURE: 99 F | HEART RATE: 60 BPM | BODY MASS INDEX: 34.92 KG/M2 | SYSTOLIC BLOOD PRESSURE: 128 MMHG | HEIGHT: 60 IN

## 2022-11-26 DIAGNOSIS — R04.0 EPISTAXIS: ICD-10-CM

## 2022-11-26 DIAGNOSIS — Z13.6 SCREENING FOR HEART DISEASE: ICD-10-CM

## 2022-11-26 DIAGNOSIS — I10 ESSENTIAL HYPERTENSION: ICD-10-CM

## 2022-11-26 DIAGNOSIS — Z00.00 ANNUAL PHYSICAL EXAM: Primary | ICD-10-CM

## 2022-11-26 DIAGNOSIS — N83.201 RIGHT OVARIAN CYST: ICD-10-CM

## 2022-11-26 DIAGNOSIS — Z12.31 ENCOUNTER FOR SCREENING MAMMOGRAM FOR MALIGNANT NEOPLASM OF BREAST: ICD-10-CM

## 2022-11-26 DIAGNOSIS — Z86.010 HISTORY OF COLON POLYPS: ICD-10-CM

## 2022-11-26 PROCEDURE — 3079F DIAST BP 80-89 MM HG: CPT | Performed by: INTERNAL MEDICINE

## 2022-11-26 PROCEDURE — 3008F BODY MASS INDEX DOCD: CPT | Performed by: INTERNAL MEDICINE

## 2022-11-26 PROCEDURE — 99396 PREV VISIT EST AGE 40-64: CPT | Performed by: INTERNAL MEDICINE

## 2022-11-26 PROCEDURE — 90471 IMMUNIZATION ADMIN: CPT | Performed by: INTERNAL MEDICINE

## 2022-11-26 PROCEDURE — 90750 HZV VACC RECOMBINANT IM: CPT | Performed by: INTERNAL MEDICINE

## 2022-11-26 PROCEDURE — 3074F SYST BP LT 130 MM HG: CPT | Performed by: INTERNAL MEDICINE

## 2022-11-26 RX ORDER — MELOXICAM 15 MG/1
TABLET ORAL
Refills: 0 | Status: CANCELLED | OUTPATIENT
Start: 2022-11-26

## 2022-11-29 ENCOUNTER — HOSPITAL ENCOUNTER (OUTPATIENT)
Dept: MAMMOGRAPHY | Facility: HOSPITAL | Age: 57
Discharge: HOME | End: 2022-11-29
Attending: INTERNAL MEDICINE
Payer: COMMERCIAL

## 2022-11-29 DIAGNOSIS — Z12.31 ENCOUNTER FOR SCREENING MAMMOGRAM FOR MALIGNANT NEOPLASM OF BREAST: ICD-10-CM

## 2022-11-29 PROCEDURE — 77063 BREAST TOMOSYNTHESIS BI: CPT | Performed by: INTERNAL MEDICINE

## 2022-11-29 PROCEDURE — 77067 SCR MAMMO BI INCL CAD: CPT | Performed by: INTERNAL MEDICINE

## 2023-01-03 ENCOUNTER — OFFICE VISIT (OUTPATIENT)
Dept: OTOLARYNGOLOGY | Facility: CLINIC | Age: 58
End: 2023-01-03
Payer: COMMERCIAL

## 2023-01-03 VITALS — WEIGHT: 177.88 LBS | TEMPERATURE: 98 F | BODY MASS INDEX: 34.92 KG/M2 | HEIGHT: 60 IN

## 2023-01-03 DIAGNOSIS — J34.2 NASAL SEPTAL DEVIATION: ICD-10-CM

## 2023-01-03 DIAGNOSIS — R04.0 EPISTAXIS: Primary | ICD-10-CM

## 2023-01-03 PROCEDURE — 30901 CONTROL OF NOSEBLEED: CPT | Performed by: SPECIALIST

## 2023-01-03 PROCEDURE — 3008F BODY MASS INDEX DOCD: CPT | Performed by: SPECIALIST

## 2023-01-03 PROCEDURE — 99203 OFFICE O/P NEW LOW 30 MIN: CPT | Performed by: SPECIALIST

## 2023-01-23 RX ORDER — VALSARTAN 80 MG/1
TABLET ORAL
Qty: 90 TABLET | Refills: 1 | Status: SHIPPED | OUTPATIENT
Start: 2023-01-23

## 2023-02-22 ENCOUNTER — OFFICE VISIT (OUTPATIENT)
Dept: OTOLARYNGOLOGY | Facility: CLINIC | Age: 58
End: 2023-02-22

## 2023-02-22 DIAGNOSIS — R04.0 EPISTAXIS: Primary | ICD-10-CM

## 2023-02-22 DIAGNOSIS — J34.2 NASAL SEPTAL DEVIATION: ICD-10-CM

## 2023-02-22 DIAGNOSIS — R06.83 SNORING: ICD-10-CM

## 2023-02-22 PROBLEM — Z00.00 ENCOUNTER FOR PREVENTIVE HEALTH EXAMINATION: Status: ACTIVE | Noted: 2023-02-22

## 2023-02-22 PROCEDURE — 99213 OFFICE O/P EST LOW 20 MIN: CPT | Performed by: SPECIALIST

## 2023-02-22 NOTE — PATIENT INSTRUCTIONS
No further epistaxis since cauterization. You can add Claritin or Allegra to the [facility] for your nasal obstruction. If this does not help, a septoplasty with Coblation of the bilateral inferior turbinates can be considered.

## 2023-02-25 RX ORDER — DILTIAZEM HYDROCHLORIDE 240 MG/1
CAPSULE, COATED, EXTENDED RELEASE ORAL
Qty: 90 CAPSULE | Refills: 0 | Status: SHIPPED | OUTPATIENT
Start: 2023-02-25

## 2023-02-25 NOTE — TELEPHONE ENCOUNTER
Please review. Protocol failed / No Protocol. Requested Prescriptions   Pending Prescriptions Disp Refills    DILTIAZEM  MG Oral Capsule SR 24 Hr [Pharmacy Med Name: DILTIAZEM 24H ER(CD) 240 MG CP] 90 capsule 0     Sig: TAKE 1 CAPSULE BY MOUTH ONCE DAILY       Hypertensive Medications Protocol Failed - 2/24/2023 12:00 AM        Failed - CMP or BMP in past 6 months     No results found for this or any previous visit (from the past 4392 hour(s)).             Failed - EGFRCR or GFRAA > 50     GFR Evaluation            Passed - In person appointment in the past 12 or next 3 months     Recent Outpatient Visits              2 days ago Epistaxis    [address], [address], Moo Hoff MD    Office Visit    1 month ago Epistaxis    [address] [facility], [place] Orlando MD    Office Visit    3 months ago Annual physical exam    [address] [facility], Tia Cerda MD    Office Visit    7 months ago     [facility], [address] [place] [address]    Office Visit    7 months ago     [facility], [address] - Last BP reading less than 140/90     BP Readings from Last 1 Encounters:  11/26/22 : 128/82              Passed - In person appointment or virtual visit in the past 6 months     Recent Outpatient Visits              2 days ago Epistaxis    [address], [address], Moo Hoff MD    Office Visit    1 month ago Epistaxis    [address] [facility], Carolina Orlando MD    Office Visit    3 months ago Annual physical exam    [address] [facility], Tia Cerda MD    Office Visit    7 months ago     [facility], [address] [place], [place] [address] Visit    7 months ago     [facility], [address] [place] [address] Visit

## 2023-05-24 ENCOUNTER — NURSE ONLY (OUTPATIENT)
Dept: INTERNAL MEDICINE CLINIC | Facility: CLINIC | Age: 58
End: 2023-05-24

## 2023-05-24 DIAGNOSIS — Z23 IMMUNIZATION DUE: Primary | ICD-10-CM

## 2023-05-24 PROCEDURE — 90750 HZV VACC RECOMBINANT IM: CPT | Performed by: INTERNAL MEDICINE

## 2023-05-24 PROCEDURE — 90471 IMMUNIZATION ADMIN: CPT | Performed by: INTERNAL MEDICINE

## 2023-05-29 NOTE — TELEPHONE ENCOUNTER
Please review. Protocol Failed or has No Protocol. Requested Prescriptions   Pending Prescriptions Disp Refills    DILTIAZEM  MG Oral Capsule SR 24 Hr [Pharmacy Med Name: DILTIAZEM 24H ER(CD) 240 MG CP] 90 capsule 0     Sig: TAKE 1 CAPSULE BY MOUTH ONCE DAILY       Hypertensive Medications Protocol Failed - 5/28/2023 12:19 AM        Failed - CMP or BMP in past 6 months     No results found for this or any previous visit (from the past 4392 hour(s)).               Failed - In person appointment or virtual visit in the past 6 months     Recent Outpatient Visits              5 days ago Immunization due    Zaire Millerison    Nurse Only    3 months ago Epistaxis    [address], [address], Marit Gosselin, MD    Office Visit    4 months ago Epistaxis    Cheryl Miller MD    Office Visit    6 months ago Annual physical exam    [address], Guanako Sheehan, Abdiel Nick MD    Office Visit    10 months ago     [place], [place]    Office Visit                      Failed - EGFRCR or GFRAA > 50     GFR Evaluation              Passed - In person appointment in the past 12 or next 3 months     Recent Outpatient Visits              5 days ago Immunization due    Noel Miller    Nurse Only    3 months ago Epistaxis    [address], [address], Marit Gosselin, MD    Office Visit    4 months ago Epistaxis    Cheryl Miller MD    Office Visit    6 months ago Annual physical exam    [address], Guanako Sheehan, Abdiel Nick MD    Office Visit    10 months ago     [facility], [address] - Last BP reading less than 140/90 BP Readings from Last 1 Encounters:  11/26/22 : 128/82                         Recent Outpatient Visits              5 days ago Immunization due    Noel Schumacher    Nurse Only    3 months ago Epistaxis    [address], [address] [facility],[address], Janell Montenegro MD    Office Visit    4 months ago Epistaxis    Lisa Mooney, Charla Taveras MD    Office Visit    6 months ago Annual physical exam    [address], [address], Anne-Marie Meadows MD    Office Visit    10 months ago     [place], [place]    Office Visit

## 2023-05-30 RX ORDER — DILTIAZEM HYDROCHLORIDE 240 MG/1
CAPSULE, COATED, EXTENDED RELEASE ORAL
Qty: 90 CAPSULE | Refills: 0 | Status: SHIPPED | OUTPATIENT
Start: 2023-05-30

## 2023-07-25 NOTE — TELEPHONE ENCOUNTER
Please review. Protocol failed / Has no protocol. [employer] message sent to patient to complete labs pended from [address] [place] [address] 11/26/22. Requested Prescriptions   Pending Prescriptions Disp Refills    VALSARTAN 80 MG Oral Tab [Pharmacy Med Name: VALSARTAN 80 MG TABLET] 90 tablet 1     Sig: TAKE 1 TABLET BY MOUTH EVERY DAY       Hypertensive Medications Protocol Failed - 7/25/2023 12:05 AM        Failed - CMP or BMP in past 6 months     No results found for this or any previous visit (from the past 4392 hour(s)).             Failed - In person appointment or virtual visit in the past 6 months     Recent Outpatient Visits              2 months ago Immunization due    Louise Alcantar, Noel    Nurse Only    5 months ago Epistaxis    [address], [address], Julia Rubin MD    Office Visit    6 months ago Epistaxis    Vito Campos MD    Office Visit    8 months ago Annual physical exam    [address], [address], Cezar Willard MD    Office Visit    12 months ago     [place], [place]    Office Visit                      Failed - EGFRCR or GFRAA > 50     GFR Evaluation            Passed - In person appointment in the past 12 or next 3 months     Recent Outpatient Visits              2 months ago Immunization due    Noel Campos    Nurse Only    5 months ago Epistaxis    [address], [address] [facility],[address], Julia Rubin MD    Office Visit    6 months ago Epistaxis    Louise Alcantar, Vito Sommer MD    Office Visit    8 months ago Annual physical exam    [address], [address], Cezar Willard MD    Office Visit    12 months ago     [place], [place]    Office Visit Passed - Last BP reading less than 140/90     BP Readings from Last 1 Encounters:  11/26/22 : 128/82                    Recent Outpatient Visits              2 months ago Immunization due    [address], Noel    Nurse Only    5 months ago Epistaxis    [address], [address] [facility],[address], Rudi Patel MD    Office Visit    6 months ago Epistaxis    [address], Parveen Vargas MD    Office Visit    8 months ago Annual physical exam    [address], [address], Maryann Salas MD    Office Visit    12 months ago     [place], [place]    Office Visit

## 2023-07-26 RX ORDER — VALSARTAN 80 MG/1
TABLET ORAL
Qty: 90 TABLET | Refills: 0 | Status: SHIPPED | OUTPATIENT
Start: 2023-07-26

## 2023-07-27 NOTE — TELEPHONE ENCOUNTER
valsartan 80 MG Oral Tab 90 tablet 0 7/26/2023     Sig: TAKE 1 TABLET BY MOUTH EVERY DAY    Sent to pharmacy as: Valsartan 80 MG Oral Tablet (Diovan)    E-Prescribing Status: Receipt confirmed by pharmacy (7/26/2023  8:47 AM CDT)      Patient contacted and made aware of Dr. Yuan Price advised follow-up visit--> scheduled. She is aware of Rx above sent. Patient verbalized understanding. No further questions or concerns at this time.     Future Appointments   Date Time Provider Faina Patel   8/30/2023 11:30 AM Unique Willard MD [facility] JESSICAO

## 2023-07-27 NOTE — TELEPHONE ENCOUNTER
2nd attempt/first below left voice mail message for pt to call back. Please, see message below when the call is returned.

## 2023-08-16 ENCOUNTER — OFFICE VISIT (OUTPATIENT)
Dept: INTERNAL MEDICINE CLINIC | Facility: CLINIC | Age: 58
End: 2023-08-16

## 2023-08-16 ENCOUNTER — NURSE TRIAGE (OUTPATIENT)
Dept: INTERNAL MEDICINE CLINIC | Facility: CLINIC | Age: 58
End: 2023-08-16

## 2023-08-16 ENCOUNTER — HOSPITAL ENCOUNTER (OUTPATIENT)
Dept: GENERAL RADIOLOGY | Age: 58
Discharge: HOME | End: 2023-08-16
Attending: INTERNAL MEDICINE
Payer: COMMERCIAL

## 2023-08-16 VITALS
DIASTOLIC BLOOD PRESSURE: 78 MMHG | BODY MASS INDEX: 36.05 KG/M2 | HEIGHT: 60 IN | TEMPERATURE: 98 F | WEIGHT: 183.63 LBS | SYSTOLIC BLOOD PRESSURE: 130 MMHG | OXYGEN SATURATION: 92 % | HEART RATE: 76 BPM

## 2023-08-16 DIAGNOSIS — M25.562 ACUTE PAIN OF LEFT KNEE: ICD-10-CM

## 2023-08-16 DIAGNOSIS — M25.562 ACUTE PAIN OF LEFT KNEE: Primary | ICD-10-CM

## 2023-08-16 PROCEDURE — 3075F SYST BP GE 130 - 139MM HG: CPT | Performed by: INTERNAL MEDICINE

## 2023-08-16 PROCEDURE — 99213 OFFICE O/P EST LOW 20 MIN: CPT | Performed by: INTERNAL MEDICINE

## 2023-08-16 PROCEDURE — 3008F BODY MASS INDEX DOCD: CPT | Performed by: INTERNAL MEDICINE

## 2023-08-16 PROCEDURE — 73560 X-RAY EXAM OF KNEE 1 OR 2: CPT | Performed by: INTERNAL MEDICINE

## 2023-08-16 PROCEDURE — 3078F DIAST BP <80 MM HG: CPT | Performed by: INTERNAL MEDICINE

## 2023-08-16 RX ORDER — MELOXICAM 7.5 MG/1
7.5 TABLET ORAL DAILY
Qty: 14 TABLET | Refills: 0 | Status: SHIPPED | OUTPATIENT
Start: 2023-08-16

## 2023-08-16 NOTE — PROGRESS NOTES
Subjective:   Patient ID: Farnaz Luciano is a 66year old female. Knee Pain   The pain is present in the left knee. This is a new problem. The current episode started in the past 7 days. There has been no history of extremity trauma. The problem occurs constantly. The quality of the pain is described as aching. The pain is moderate. Associated symptoms include joint swelling, a limited range of motion and stiffness. Pertinent negatives include no fever or inability to bear weight. The symptoms are aggravated by standing. She has tried acetaminophen for the symptoms. The treatment provided mild relief. Her past medical history is significant for osteoarthritis. History/Other:   Review of Systems   Constitutional: Negative. Negative for fever. Musculoskeletal:  Positive for stiffness. Left knee pain     Current Outpatient Medications   Medication Sig Dispense Refill    valsartan 80 MG Oral Tab TAKE 1 TABLET BY MOUTH EVERY DAY 90 tablet 0    dilTIAZem  MG Oral Capsule SR 24 Hr TAKE 1 CAPSULE BY MOUTH ONCE DAILY. 90 capsule 0    BLACK COHOSH OR Take by mouth. cyclobenzaprine 10 MG Oral Tab Take 10 mg by mouth as needed. (Patient not taking: Reported on 11/26/2022)      fluticasone propionate 50 MCG/ACT Nasal Suspension 2 sprays by Nasal route daily. (Patient not taking: Reported on 2/22/2023) 16 mL 2    Meloxicam 15 MG Oral Tab  (Patient not taking: Reported on 8/16/2023)       Allergies:No Known Allergies    Objective:   Physical Exam  Constitutional:       General: She is not in acute distress. Appearance: She is not ill-appearing, toxic-appearing or diaphoretic. Musculoskeletal:      Right knee: Normal.      Left knee: Swelling present. No deformity, erythema, ecchymosis, bony tenderness or crepitus. Decreased range of motion. Tenderness present over the lateral joint line. No medial joint line, MCL, LCL, ACL, PCL or patellar tendon tenderness.  No LCL laxity, MCL laxity, ACL laxity or PCL laxity. Normal alignment, normal meniscus and normal patellar mobility. Normal pulse. Instability Tests: Anterior drawer test negative. Posterior drawer test negative. Right lower leg: No swelling. No edema. Left lower leg: No swelling. No edema. Skin:     Coloration: Skin is not jaundiced or pale. Neurological:      Mental Status: She is alert. Assessment & Plan:   (M25.562) Acute pain of left knee  (primary encounter diagnosis)  Plan: XR KNEE (1 OR 2 VIEWS), LEFT (CPT=73560)        Pt with known hx  of OA of knees before, will get xray. Pt given meloxicam to be taken with full stomach; continue with cold compress. Call back if persist/worsens. No orders of the defined types were placed in this encounter.       Meds This Visit:  Requested Prescriptions      No prescriptions requested or ordered in this encounter       Imaging & Referrals:  None

## 2023-08-16 NOTE — TELEPHONE ENCOUNTER
Spoke with patient. Appointment scheduled for 1:30 pm today.      .  Future Appointments   Date Time Provider Faina Patel   8/16/2023  1:30 PM [facility]   8/30/2023 11:30 AM Jerry Rowland MD [facility]

## 2023-08-16 NOTE — TELEPHONE ENCOUNTER
Action Requested: Summary for Provider     []  Critical Lab, Recommendations Needed  [x] Need Additional Advice  []   FYI    []   Need Orders  [] Need Medications Sent to Pharmacy  []  Other     SUMMARY: Patient stated that has on and off left knee pain/swelling. Last 2 days has been having left knee pain and swelling. Denies any injury. Icing and taking tylenol at night which helps but during the day still having the symptoms. No other symptoms. Does not have appointment with Dr Caprice Trevino till the end of the month. No appointments available this week with Dr Caprice Trevino. Elisa Power other providers. Wanted to know if needed x-ray, see orthopedics, or what doctor recommends? Please advise.    (Has seen Dr Sung Juárez in 2015 for the left knee)  Reason for call: Knee Pain (Left knee pain/swelling)  Onset: Aug 14, 2023    Reason for Disposition   MILD or MODERATE swelling (e.g., can't move joint normally, can't do usual activities) (Exceptions: Itchy, localized swelling; swelling is chronic.)    Protocols used: Knee Swelling-A-OH

## 2023-08-30 ENCOUNTER — OFFICE VISIT (OUTPATIENT)
Dept: INTERNAL MEDICINE CLINIC | Facility: CLINIC | Age: 58
End: 2023-08-30

## 2023-08-30 ENCOUNTER — LAB ENCOUNTER (OUTPATIENT)
Dept: LAB | Age: 58
End: 2023-08-30
Attending: INTERNAL MEDICINE
Payer: COMMERCIAL

## 2023-08-30 VITALS
BODY MASS INDEX: 35.87 KG/M2 | HEART RATE: 61 BPM | TEMPERATURE: 98 F | WEIGHT: 182.69 LBS | DIASTOLIC BLOOD PRESSURE: 80 MMHG | OXYGEN SATURATION: 99 % | HEIGHT: 60 IN | SYSTOLIC BLOOD PRESSURE: 128 MMHG

## 2023-08-30 DIAGNOSIS — Z13.6 SCREENING FOR HEART DISEASE: ICD-10-CM

## 2023-08-30 DIAGNOSIS — I10 ESSENTIAL HYPERTENSION: Primary | ICD-10-CM

## 2023-08-30 DIAGNOSIS — R35.1 NOCTURIA: ICD-10-CM

## 2023-08-30 DIAGNOSIS — Z12.31 ENCOUNTER FOR SCREENING MAMMOGRAM FOR BREAST CANCER: ICD-10-CM

## 2023-08-30 DIAGNOSIS — Z00.00 ANNUAL PHYSICAL EXAM: ICD-10-CM

## 2023-08-30 LAB
ALBUMIN SERPL-MCNC: 3.4 G/DL (ref 3.4–5)
ALBUMIN/GLOB SERPL: 0.9 {RATIO} (ref 1–2)
ALP LIVER SERPL-CCNC: 130 U/L
ALT SERPL-CCNC: 43 U/L
ANION GAP SERPL CALC-SCNC: 2 MMOL/L (ref 0–18)
AST SERPL-CCNC: 13 U/L (ref 15–37)
BASOPHILS # BLD AUTO: 0.04 X10(3) UL (ref 0–0.2)
BASOPHILS NFR BLD AUTO: 0.8 %
BILIRUB SERPL-MCNC: 0.3 MG/DL (ref 0.1–2)
BILIRUB UR QL STRIP.AUTO: NEGATIVE
BUN BLD-MCNC: 12 MG/DL (ref 7–18)
CALCIUM BLD-MCNC: 9.2 MG/DL (ref 8.5–10.1)
CHLORIDE SERPL-SCNC: 110 MMOL/L (ref 98–112)
CHOLEST SERPL-MCNC: 137 MG/DL (ref ?–200)
CLARITY UR REFRACT.AUTO: CLEAR
CO2 SERPL-SCNC: 29 MMOL/L (ref 21–32)
CREAT BLD-MCNC: 0.83 MG/DL
EGFRCR SERPLBLD CKD-EPI 2021: 83 ML/MIN/1.73M2 (ref 60–?)
EOSINOPHIL # BLD AUTO: 0.23 X10(3) UL (ref 0–0.7)
EOSINOPHIL NFR BLD AUTO: 4.3 %
ERYTHROCYTE [DISTWIDTH] IN BLOOD BY AUTOMATED COUNT: 13.9 %
FASTING PATIENT LIPID ANSWER: YES
FASTING STATUS PATIENT QL REPORTED: YES
GLOBULIN PLAS-MCNC: 4 G/DL (ref 2.8–4.4)
GLUCOSE BLD-MCNC: 103 MG/DL (ref 70–99)
GLUCOSE UR STRIP.AUTO-MCNC: NORMAL MG/DL
HCT VFR BLD AUTO: 45.8 %
HDLC SERPL-MCNC: 63 MG/DL (ref 40–59)
HGB BLD-MCNC: 14.6 G/DL
IMM GRANULOCYTES # BLD AUTO: 0.01 X10(3) UL (ref 0–1)
IMM GRANULOCYTES NFR BLD: 0.2 %
KETONES UR STRIP.AUTO-MCNC: NEGATIVE MG/DL
LDLC SERPL CALC-MCNC: 59 MG/DL (ref ?–100)
LEUKOCYTE ESTERASE UR QL STRIP.AUTO: 75
LYMPHOCYTES # BLD AUTO: 1.83 X10(3) UL (ref 1–4)
LYMPHOCYTES NFR BLD AUTO: 34.5 %
MCH RBC QN AUTO: 28.3 PG (ref 26–34)
MCHC RBC AUTO-ENTMCNC: 31.9 G/DL (ref 31–37)
MCV RBC AUTO: 88.9 FL
MONOCYTES # BLD AUTO: 0.59 X10(3) UL (ref 0.1–1)
MONOCYTES NFR BLD AUTO: 11.1 %
NEUTROPHILS # BLD AUTO: 2.6 X10 (3) UL (ref 1.5–7.7)
NEUTROPHILS # BLD AUTO: 2.6 X10(3) UL (ref 1.5–7.7)
NEUTROPHILS NFR BLD AUTO: 49.1 %
NITRITE UR QL STRIP.AUTO: NEGATIVE
NONHDLC SERPL-MCNC: 74 MG/DL (ref ?–130)
OSMOLALITY SERPL CALC.SUM OF ELEC: 292 MOSM/KG (ref 275–295)
PH UR STRIP.AUTO: 6.5 [PH] (ref 5–8)
PLATELET # BLD AUTO: 260 10(3)UL (ref 150–450)
POTASSIUM SERPL-SCNC: 3.8 MMOL/L (ref 3.5–5.1)
PROT SERPL-MCNC: 7.4 G/DL (ref 6.4–8.2)
PROT UR STRIP.AUTO-MCNC: NEGATIVE MG/DL
RBC # BLD AUTO: 5.15 X10(6)UL
SODIUM SERPL-SCNC: 141 MMOL/L (ref 136–145)
SP GR UR STRIP.AUTO: 1.02 (ref 1–1.03)
TRIGL SERPL-MCNC: 74 MG/DL (ref 30–149)
TSI SER-ACNC: 1.75 MIU/ML (ref 0.36–3.74)
UROBILINOGEN UR STRIP.AUTO-MCNC: NORMAL MG/DL
VLDLC SERPL CALC-MCNC: 11 MG/DL (ref 0–30)
WBC # BLD AUTO: 5.3 X10(3) UL (ref 4–11)

## 2023-08-30 PROCEDURE — 36415 COLL VENOUS BLD VENIPUNCTURE: CPT

## 2023-08-30 PROCEDURE — 81001 URINALYSIS AUTO W/SCOPE: CPT

## 2023-08-30 PROCEDURE — 85025 COMPLETE CBC W/AUTO DIFF WBC: CPT

## 2023-08-30 PROCEDURE — 3008F BODY MASS INDEX DOCD: CPT | Performed by: INTERNAL MEDICINE

## 2023-08-30 PROCEDURE — 99214 OFFICE O/P EST MOD 30 MIN: CPT | Performed by: INTERNAL MEDICINE

## 2023-08-30 PROCEDURE — 80053 COMPREHEN METABOLIC PANEL: CPT

## 2023-08-30 PROCEDURE — 3074F SYST BP LT 130 MM HG: CPT | Performed by: INTERNAL MEDICINE

## 2023-08-30 PROCEDURE — 80061 LIPID PANEL: CPT

## 2023-08-30 PROCEDURE — 3079F DIAST BP 80-89 MM HG: CPT | Performed by: INTERNAL MEDICINE

## 2023-08-30 PROCEDURE — 84443 ASSAY THYROID STIM HORMONE: CPT

## 2023-09-03 ENCOUNTER — TELEPHONE (OUTPATIENT)
Dept: INTERNAL MEDICINE CLINIC | Facility: CLINIC | Age: 58
End: 2023-09-03

## 2023-09-03 DIAGNOSIS — R74.8 ELEVATED ALKALINE PHOSPHATASE LEVEL: Primary | ICD-10-CM

## 2023-09-03 DIAGNOSIS — R35.1 NOCTURIA: ICD-10-CM

## 2023-09-06 ENCOUNTER — TELEPHONE (OUTPATIENT)
Dept: INTERNAL MEDICINE CLINIC | Facility: CLINIC | Age: 58
End: 2023-09-06

## 2023-09-06 NOTE — TELEPHONE ENCOUNTER
Patient wants Dr Lucrecia Angelo or his nurse to call her with what to do / take because she said she has an infection showing in her urinalysis results.   Call [phone]  okay to leave a voicemail

## 2023-09-06 NOTE — TELEPHONE ENCOUNTER
Advised patient of Dr. Aleksandr Ferrari note and number given to urology. Patient verbalized understanding. Hi Liliana     Your cbc blood count, cholesterol panel, tsh thyroid test, were good. Your urine showed no signs of infection, had normal wbc and rbc on microscopic exam of your urine. I would recommend seeing urologist for your frequent night time urination. I will refer you to Dr Destinee Lehman and will put referral in your chart. Your chemistry panel showed glucose/sugar in prediabetic range so watch your carbs I diet like bread, pasta, and sweets. Your alk phos which is either a liver  or bone enzyme was mildly elevated. Will need to repeat to confirm to make sure not a lab error. I will put order in your chart.    Written by Luis Metzger MD on 9/3/2023  9:33 AM CDT

## 2023-10-13 NOTE — PATIENT INSTRUCTIONS
Toradol injection in office today 60 mg followed by Toradol tablets to start tomorrow. You may take 1 tablet every 6 hours as needed for pain and inflammation for 5 days. Do not take any other NSAIDs while on the Toradol regimen. When she finished the Toradol regimen you may resume use of ibuprofen or Advil as needed for inflammation and pain. Taper dose as pain and inflammation subside. Sprain/strain   First 24 hours, use ice to injury site for 15 minutes at a time. After 48 hours, may alternate ice/heat 15 minutes each. R.I. C.E. therapy = rest, ice, compression and elevation. May use ace wrap to injured area for compression. Use Ibuprofen or other antiinflammatory for pain and inflammation. If no open skin areas, OTC topical lidocaine such as Icy Hot or capsaicin creams may be applied for pain relief. Slow stretches of area may help reduce spasms and improve range of motion. Alternate Voltaren cream with Biofreeze gel to site 2 to 3 times a day . You had a very large vessel on the right mid septum which was fully cauterized. You also have a left septal deviation. No nose blowing for 1 week's time. Follow-up in 2 weeks time, sooner if problems. You can also consider a septoplasty for the septal deviation.

## 2023-10-27 RX ORDER — VALSARTAN 80 MG/1
TABLET ORAL
Qty: 90 TABLET | Refills: 3 | Status: SHIPPED | OUTPATIENT
Start: 2023-10-27

## 2023-10-27 NOTE — TELEPHONE ENCOUNTER
Refill passed per [employer] protocol.      Requested Prescriptions   Pending Prescriptions Disp Refills    VALSARTAN 80 MG Oral Tab [Pharmacy Med Name: VALSARTAN 80 MG TABLET] 90 tablet 0     Sig: TAKE 1 TABLET BY MOUTH EVERY DAY       Hypertensive Medications Protocol Passed - 10/27/2023  1:00 AM        Passed - In person appointment in the past 12 or next 3 months     Recent Outpatient Visits              1 month ago Essential hypertension    [address], [place] [address], [place], MD    Office Visit    2 months ago Acute pain of left knee    [address] [facility], [place], MD    Office Visit    5 months ago Immunization due    [address] [facility], No[place]    Nurse Only    8 months ago Epistaxis    [address], [address], Rudi Patel MD    Office Visit    9 months ago Epistaxis    [address], [address], Parveen Vargas MD    Office Visit                      Passed - Last BP reading less than 140/90     BP Readings from Last 1 Encounters:  08/30/23 : 128/80              Passed - CMP or BMP in past 6 months     Recent Results (from the past 4392 hour(s))   Comp Metabolic Panel (14)    Collection Time: 08/30/23  9:48 AM   Result Value Ref Range    Glucose 103 (H) 70 - 99 mg/dL    Sodium 141 136 - 145 mmol/L    Potassium 3.8 3.5 - 5.1 mmol/L    Chloride 110 98 - 112 mmol/L    CO2 29.0 21.0 - 32.0 mmol/L    Anion Gap 2 0 - 18 mmol/L    BUN 12 7 - 18 mg/dL    Creatinine 0.83 0.55 - 1.02 mg/dL    Calcium, Total 9.2 8.5 - 10.1 mg/dL    Calculated Osmolality 292 275 - 295 mOsm/kg    eGFR-Cr 83 >=60 mL/min/1.73m2    AST 13 (L) 15 - 37 U/L    ALT 43 13 - 56 U/L    Alkaline Phosphatase 130 (H) 46 - 118 U/L    Bilirubin, Total 0.3 0.1 - 2.0 mg/dL    Total Protein 7.4 6.4 - 8.2 g/dL    Albumin 3.4 3.4 - 5.0 g/dL    Globulin  4.0 2.8 - 4.4 g/dL    A/G Ratio 0.9 (L) 1.0 - 2.0    Patient Fasting for CMP? Yes      *Note: Due to a large number of results and/or encounters for the requested time period, some results have not been displayed. A complete set of results can be found in Results Review.                Passed - In person appointment or virtual visit in the past 6 months     Recent Outpatient Visits              1 month ago Essential hypertension    [address], [address], Arturo Blanco MD    Office Visit    2 months ago Acute pain of left knee    [address] [facility], Arturo Blanco MD    Office Visit    5 months ago Immunization due    [address] [facility], [place]    Nurse Only    8 months ago Epistaxis    [address], [address] [facility],[address], Joanne Roland MD    Office Visit    9 months ago Epistaxis    [address] [facility], Dave Garcia MD    Office Visit                      Passed - EGFRCR or GFRAA > 50     GFR Evaluation  EGFRCR: 83 , resulted on 8/30/2023

## 2023-12-07 RX ORDER — DILTIAZEM HYDROCHLORIDE 240 MG/1
CAPSULE, COATED, EXTENDED RELEASE ORAL
Qty: 90 CAPSULE | Refills: 1 | Status: SHIPPED | OUTPATIENT
Start: 2023-12-07

## 2023-12-07 NOTE — TELEPHONE ENCOUNTER
Refill passed per [facility] protocol but not refilled due to high level interaction warning.      Requested Prescriptions   Pending Prescriptions Disp Refills    DILTIAZEM  MG Oral Capsule SR 24 Hr [Pharmacy Med Name: DILTIAZEM 24H ER(CD) 240 MG CP] 90 capsule 0     Sig: TAKE 1 CAPSULE BY MOUTH EVERY DAY       Hypertensive Medications Protocol Passed - 12/7/2023 12:29 AM        Passed - In person appointment in the past 12 or next 3 months     Recent Outpatient Visits              3 months ago Essential hypertension    [address], [place], Noel Moreno MD    Office Visit    3 months ago Acute pain of left knee    [address], [address], Noel Moreno MD    Office Visit    6 months ago Immunization due    [facility], [place]    Nurse Only    9 months ago Epistaxis    [address], [address] [facility],[address], Malachi Eric MD    Office Visit    11 months ago Epistaxis    [address], [address], Hill Dupont MD    Office Visit                      Passed - Last BP reading less than 140/90     BP Readings from Last 1 Encounters:   08/30/23 128/80               Passed - CMP or BMP in past 6 months     Recent Results (from the past 4392 hour(s))   Comp Metabolic Panel (14)    Collection Time: 08/30/23  9:48 AM   Result Value Ref Range    Glucose 103 (H) 70 - 99 mg/dL    Sodium 141 136 - 145 mmol/L    Potassium 3.8 3.5 - 5.1 mmol/L    Chloride 110 98 - 112 mmol/L    CO2 29.0 21.0 - 32.0 mmol/L    Anion Gap 2 0 - 18 mmol/L    BUN 12 7 - 18 mg/dL    Creatinine 0.83 0.55 - 1.02 mg/dL    Calcium, Total 9.2 8.5 - 10.1 mg/dL    Calculated Osmolality 292 275 - 295 mOsm/kg    eGFR-Cr 83 >=60 mL/min/1.73m2    AST 13 (L) 15 - 37 U/L    ALT 43 13 - 56 U/L    Alkaline Phosphatase 130 (H) 46 - 118 U/L    Bilirubin, Total 0.3 0.1 - 2.0 mg/dL    Total Protein 7.4 6.4 - 8.2 g/dL Albumin 3.4 3.4 - 5.0 g/dL    Globulin  4.0 2.8 - 4.4 g/dL    A/G Ratio 0.9 (L) 1.0 - 2.0    Patient Fasting for CMP? Yes      *Note: Due to a large number of results and/or encounters for the requested time period, some results have not been displayed. A complete set of results can be found in Results Review.                Passed - In person appointment or virtual visit in the past 6 months     Recent Outpatient Visits              3 months ago Essential hypertension    [facility], [address], Munir Shannon MD    Office Visit    3 months ago Acute pain of left knee    Munir Lundy MD    Office Visit    6 months ago Immunization due    [facility], [place]    Nurse Only    9 months ago Epistaxis    [facility], [address], Nader Agarwal MD    Office Visit    11 months ago Epistaxis    [facility], [place]er[place] Quintana MD    Office Visit                      Passed - EGFRCR or GFRAA > 50     GFR Evaluation  EGFRCR: 83 , resulted on 8/30/2023               [unfilled]      @[email or web address]@

## 2024-02-28 ENCOUNTER — HOSPITAL ENCOUNTER (OUTPATIENT)
Dept: MAMMOGRAPHY | Facility: HOSPITAL | Age: 59
Discharge: HOME | End: 2024-02-28
Attending: INTERNAL MEDICINE
Payer: COMMERCIAL

## 2024-02-28 DIAGNOSIS — Z12.31 ENCOUNTER FOR SCREENING MAMMOGRAM FOR BREAST CANCER: ICD-10-CM

## 2024-02-28 PROCEDURE — 77067 SCR MAMMO BI INCL CAD: CPT | Performed by: INTERNAL MEDICINE

## 2024-02-28 PROCEDURE — 77063 BREAST TOMOSYNTHESIS BI: CPT | Performed by: INTERNAL MEDICINE

## 2024-04-03 ENCOUNTER — OFFICE VISIT (OUTPATIENT)
Dept: OBGYN CLINIC | Facility: CLINIC | Age: 59
End: 2024-04-03

## 2024-04-03 VITALS
WEIGHT: 182 LBS | HEART RATE: 88 BPM | DIASTOLIC BLOOD PRESSURE: 88 MMHG | SYSTOLIC BLOOD PRESSURE: 132 MMHG | BODY MASS INDEX: 36 KG/M2

## 2024-04-03 DIAGNOSIS — Z01.419 ENCOUNTER FOR GYNECOLOGICAL EXAMINATION WITHOUT ABNORMAL FINDING: Primary | ICD-10-CM

## 2024-04-03 PROCEDURE — 3079F DIAST BP 80-89 MM HG: CPT | Performed by: OBSTETRICS & GYNECOLOGY

## 2024-04-03 PROCEDURE — 3075F SYST BP GE 130 - 139MM HG: CPT | Performed by: OBSTETRICS & GYNECOLOGY

## 2024-04-03 PROCEDURE — 99396 PREV VISIT EST AGE 40-64: CPT | Performed by: OBSTETRICS & GYNECOLOGY

## 2024-04-03 RX ORDER — VIT C/B6/B5/MAGNESIUM/HERB 173 50-5-6-5MG
CAPSULE ORAL
COMMUNITY

## 2024-04-03 NOTE — PROGRESS NOTES
Liliana Mckeon is a 58 year old female  Patient's last menstrual period was 2018.   Chief Complaint   Patient presents with    Gyn Exam     Annual, questions about excessive moles -- around neck, arm, private area     .    OBSTETRICS HISTORY:     OB History    Para Term  AB Living   2 2 2     2   SAB IAB Ectopic Multiple Live Births           2      # Outcome Date GA Lbr Krzysztof/2nd Weight Sex Delivery Anes PTL Lv   2 Term      NORMAL SPONT   MAGALI   1 Term      NORMAL SPONT   MAGALI       GYNE HISTORY:     Periods none due to hysterectomy      BCM:  Hysterectomy    History   Sexual Activity    Sexual activity: Yes    Birth control/ protection: Tubal Ligation        Menarche: 14  Pap Date: 15  Pap Result Notes: Negative HPv negative; Mammo  Bilateral 21 neg           No data to display                  MEDICAL HISTORY:     Past Medical History:   Diagnosis Date    Anemia     Back problem     Carpal tunnel syndrome, bilateral     Disc displacement 2007    Diverticulosis of large intestine     Hiccups 2013    \"Chronic hiccups of unk origin\"    History of blood transfusion     due to heavy periods    History of stomach ulcers     Iron deficiency anemia     Knee pain, bilateral     Nocturia 2004    Detrol x 3 months     Patellofemoral syndrome 2007    Unspecified essential hypertension 2005    Visual impairment     glaases     Past Surgical History:   Procedure Laterality Date    COLONOSCOPY      OTHER  2020    umbilical hernia repair with mesh    REDUCTION LEFT      2008 BILAT REDUCTION    REDUCTION OF LARGE BREAST  2010    REDUCTION RIGHT      TONSILLECTOMY  1980    TOTAL ABDOMINAL HYSTERECTOMY Bilateral 2018    Total Abdominal Hysterectomy with Bilateral Salpingectomy, excision of paratubal cyst - [facility]    TUBAL LIGATION Bilateral      OB History    Para Term  AB Living   2 2 2 0 0 2   SAB IAB Ectopic Multiple Live Births   0 0 0 0 2        SOCIAL  HISTORY:     Tobacco Use: Low Risk  (4/3/2024)    Patient History     Smoking Tobacco Use: Never     Smokeless Tobacco Use: Never     Passive Exposure: Not on file       FAMILY HISTORY:     Family History   Problem Relation Age of Onset    No Known Problems Sister     Cancer Father         Throat    Diabetes Father     Hypertension Mother          MEDICATIONS:       Current Outpatient Medications:     Turmeric (QC TUMERIC COMPLEX) 500 MG Oral Cap, Take by mouth., Disp: , Rfl:     dilTIAZem  MG Oral Capsule SR 24 Hr, TAKE 1 CAPSULE BY MOUTH EVERY DAY, Disp: 90 capsule, Rfl: 1    valsartan 80 MG Oral Tab, TAKE 1 TABLET BY MOUTH EVERY DAY, Disp: 90 tablet, Rfl: 3    BLACK COHOSH OR, Take by mouth., Disp: , Rfl:     Meloxicam 7.5 MG Oral Tab, Take 1 tablet (7.5 mg total) by mouth daily. (Patient not taking: Reported on 8/30/2023), Disp: 14 tablet, Rfl: 0    cyclobenzaprine 10 MG Oral Tab, Take 10 mg by mouth as needed. (Patient not taking: Reported on 11/26/2022), Disp: , Rfl:     fluticasone propionate 50 MCG/ACT Nasal Suspension, 2 sprays by Nasal route daily. (Patient not taking: Reported on 2/22/2023), Disp: 16 mL, Rfl: 2    Meloxicam 15 MG Oral Tab, , Disp: , Rfl:     ALLERGIES:     No Known Allergies      REVIEW OF SYSTEMS:     Constitutional:    denies fever / chills  Eyes:     denies blurred or double vision  Cardiovascular:  denies chest pain or palpitations  Respiratory:    denies shortness of breath  Gastrointestinal:  denies severe abdominal pain, frequent diarrhea or constipation, nausea / vomiting  Genitourinary:    denies dysuria, bothersome incontinence  Skin/Breast:   denies any breast pain, lumps, or discharge  Neurological:    denies frequent severe headaches  Psychiatric:   denies depression or anxiety, thoughts of harming self or others  Heme/Lymph:    denies easy bruising or bleeding      PHYSICAL EXAM:   Blood pressure 132/88, pulse 88, weight 182 lb (82.6 kg), last menstrual period  07/31/2018, unknown if currently breastfeeding.  Constitutional:  well developed, well nourished  Head/Face:  normocephalic  Neck/Thyroid: thyroid symmetric, no thyromegaly, no nodules, no adenopathy  Lymphatic: no abnormal supraclavicular or axillary adenopathy is noted  Breast:   normal without palpable masses, tenderness, asymmetry, nipple discharge, nipple retraction or skin changes  Abdomen:   soft, nontender, nondistended, no masses  Skin/Hair:  no unusual rashes or bruises  Extremities:  no edema, no cyanosis  Psychiatric:   oriented to time, place, person and situation. Appropriate mood and affect    Pelvic Exam:  External Genitalia:  normal appearance, hair distribution, and no lesions  Urethral Meatus:   normal in size, location, without lesions and prolapse  Bladder:    no fullness, masses or tenderness  Vagina:    normal appearance without lesions, no abnormal discharge  Cervix:    absent  Uterus:    absent  Adnexa:   normal without masses or tenderness  Perineum:   normal  Anus:    no hemorroids         ASSESSMENT & PLAN:     Liliana was seen today for gyn exam.    Diagnoses and all orders for this visit:    Encounter for gynecological examination without abnormal finding    Reassured pt on skin tags    SUMMARY:  Pap: No more paps per ASCCP guidelines.  BCM:  Hysterectomy  STD screening: declines  Mammogram: done recently  HM updated  Depression screen:   Depression Screening (PHQ-2/PHQ-9): Over the LAST 2 WEEKS   Little interest or pleasure in doing things (over the last two weeks)?: Not at all    Feeling down, depressed, or hopeless (over the last two weeks)?: Not at all    PHQ-2 SCORE: 0          FOLLOW-UP     Return in about 1 year (around 4/3/2025) for annual gyne exam.    Note to patient and family:  The 21st Century Cures Act makes medical notes available to patients in the interest of transparency.  However, please be advised that this is a medical document.  It is intended as a peer to peer  communication.  It is written in medical language and may contain abbreviations or verbiage that are technical and unfamiliar.  It may appear blunt or direct.  Medical documents are intended to carry relevant information, facts as evident, and the clinical opinion of the practitioner.

## 2024-06-25 RX ORDER — DILTIAZEM HYDROCHLORIDE 240 MG/1
CAPSULE, COATED, EXTENDED RELEASE ORAL
Qty: 90 CAPSULE | Refills: 3 | Status: SHIPPED | OUTPATIENT
Start: 2024-06-25

## 2024-06-25 NOTE — TELEPHONE ENCOUNTER
Refill Per Protocol     Requested Prescriptions   Pending Prescriptions Disp Refills    DILTIAZEM  MG Oral Capsule SR 24 Hr [Pharmacy Med Name: DILTIAZEM 24H ER(CD) 240 MG CP] 90 capsule 1     Sig: TAKE 1 CAPSULE BY MOUTH EVERY DAY       Hypertension Medications Protocol Passed - 6/22/2024  6:57 AM        Passed - CMP or BMP in past 12 months        Passed - Last BP reading less than 140/90     BP Readings from Last 1 Encounters:   04/03/24 132/88               Passed - In person appointment or virtual visit in the past 12 mos or appointment in next 3 mos     Recent Outpatient Visits              2 months ago Encounter for gynecological examination without abnormal finding    [facility] Angela Donnelly MD    Office Visit    10 months ago Essential hypertension    [facility] Efra Gill MD    Office Visit    10 months ago Acute pain of left knee    [facility] Efra Gill MD    Office Visit    1 year ago Immunization due    [facility]    Nurse Only    1 year ago Epistaxis    [facility] Kelsea Bonilla MD    Office Visit          Future Appointments         Provider Department Appt Notes    In 2 weeks Kelsea Bonilla MD [facility] follow up nose bleed                    Passed - EGFRCR or GFRAA > 50     GFR Evaluation  EGFRCR: 83 , resulted on 8/30/2023                 Future Appointments         Provider Department Appt Notes    In 2 weeks Kelsea Bonilla MD [facility] follow up nose bleed          Recent Outpatient Visits              2 months ago Encounter for gynecological examination without abnormal finding    [facility] - OB/Angela Brice MD     Office Visit    10 months ago Essential hypertension    [facility], [place] Efra Gill MD    Office Visit    10 months ago Acute pain of left knee    [facility], Efra Garner MD    Office Visit    1 year ago Immunization due    [facility], [place]    Nurse Only    1 year ago Epistaxis    [facility] [place] Kelsea Bonilla MD    Office Visit

## 2024-07-09 ENCOUNTER — OFFICE VISIT (OUTPATIENT)
Dept: OTOLARYNGOLOGY | Facility: CLINIC | Age: 59
End: 2024-07-09
Payer: COMMERCIAL

## 2024-07-09 VITALS — WEIGHT: 182 LBS | BODY MASS INDEX: 35.73 KG/M2 | HEIGHT: 60 IN

## 2024-07-09 DIAGNOSIS — J34.2 NASAL SEPTAL DEVIATION: ICD-10-CM

## 2024-07-09 DIAGNOSIS — R04.0 EPISTAXIS: Primary | ICD-10-CM

## 2024-07-09 PROCEDURE — 3008F BODY MASS INDEX DOCD: CPT | Performed by: SPECIALIST

## 2024-07-09 PROCEDURE — 30901 CONTROL OF NOSEBLEED: CPT | Performed by: SPECIALIST

## 2024-07-09 PROCEDURE — 99213 OFFICE O/P EST LOW 20 MIN: CPT | Performed by: SPECIALIST

## 2024-07-09 NOTE — PATIENT INSTRUCTIONS
Your right nasal septum was cauterized.  A bipolar cauterization can be done if this is not successful.  There was some slight fullness in your left lateral oropharyngeal wall.  This can be reexamined if you have any swallowing difficulty.

## 2024-07-09 NOTE — PROGRESS NOTES
Liliana Mckeon is a 59 year old female.   Chief Complaint   Patient presents with    Follow - Up     epistaxis     HPI:   Patient here with recurrent right epistaxis.  Last was cauterized early last year.    Current Outpatient Medications   Medication Sig Dispense Refill    dilTIAZem  MG Oral Capsule SR 24 Hr TAKE 1 CAPSULE BY MOUTH EVERY DAY 90 capsule 3    Turmeric (QC TUMERIC COMPLEX) 500 MG Oral Cap Take by mouth.      valsartan 80 MG Oral Tab TAKE 1 TABLET BY MOUTH EVERY DAY 90 tablet 3    BLACK COHOSH OR Take by mouth.      Meloxicam 7.5 MG Oral Tab Take 1 tablet (7.5 mg total) by mouth daily. (Patient not taking: Reported on 8/30/2023) 14 tablet 0    cyclobenzaprine 10 MG Oral Tab Take 10 mg by mouth as needed. (Patient not taking: Reported on 11/26/2022)      fluticasone propionate 50 MCG/ACT Nasal Suspension 2 sprays by Nasal route daily. (Patient not taking: Reported on 2/22/2023) 16 mL 2    Meloxicam 15 MG Oral Tab  (Patient not taking: Reported on 8/16/2023)        Past Medical History:    Anemia    Back problem    Carpal tunnel syndrome, bilateral    Disc displacement    Diverticulosis of large intestine    Hiccups    \"Chronic hiccups of unk origin\"    History of blood transfusion    due to heavy periods    History of stomach ulcers    Iron deficiency anemia    Knee pain, bilateral    Nocturia    Detrol x 3 months     Patellofemoral syndrome    Unspecified essential hypertension    Visual impairment    glaases      Social History:  Social History     Socioeconomic History    Marital status:    Tobacco Use    Smoking status: Never    Smokeless tobacco: Never   Vaping Use    Vaping status: Never Used   Substance and Sexual Activity    Alcohol use: Yes     Comment: Wine, 2 drinks occasionally     Drug use: No    Sexual activity: Yes     Birth control/protection: Tubal Ligation   Other Topics Concern    Caffeine Concern Yes     Comment: Coffee     Social Determinants of Health       Received from [facility]        REVIEW OF SYSTEMS:   GENERAL HEALTH: feels well otherwise  GENERAL : denies fever, chills, sweats, weight loss, weight gain  SKIN: denies any unusual skin lesions or rashes  RESPIRATORY: denies shortness of breath with exertion  NEURO: denies headaches    EXAM:   Ht 5' (1.524 m)   Wt 182 lb (82.6 kg)   LMP 07/31/2018   BMI 35.54 kg/m²   System Details   Skin Inspection - Normal.   Constitutional Overall appearance - Normal.   Head/Face Facial features - Normal. Eyebrows - Normal. Skull - Normal.   Eyes Conjunctiva - Right: Normal, Left: Normal. Pupil - Right: Normal, Left: Normal.    Ears Inspection - Right: Normal, Left: Normal.   Canal - Right: Normal, Left: Normal.   TM - Right: Normal, Left: Normal.   Nasal External nose - Normal.   Nasal septum -left septal deviation.  Clot seen on right mid nasal septum.  Area was anesthetized with Ryan-Synephrine and lidocaine after consent was obtained.  A silver nitrate stick was used to cauterize the area.  Neosporin ointment was placed.  No complications.  Turbinates - Normal.   Oral/Oropharynx Lips - Normal, Tonsils -absent.  Some fullness of the left lateral pharyngeal wall.  Tongue - Normal    Neck Exam Inspection - Normal. Palpation - Normal. Parotid gland - Normal. Thyroid gland - Normal.   Lymph Detail Submental. Submandibular. Anterior cervical. Posterior cervical. Supraclavicular all without enlargement   Psychiatric Orientation - Oriented to time, place, person & situation. Appropriate mood and affect.   Neurological Memory - Normal. Cranial nerves - Cranial nerves II through XII grossly intact.     ASSESSMENT AND PLAN:   1. Epistaxis  Cauterized as above.  If not helpful, will consider bipolar cauterization.  On the right.    2. Nasal septal deviation  To the left.  Some fullness of the left oropharyngeal area.  No obvious pulsations.  No previous imaging of the area.  Can recheck should there be any additional  symptoms.      The patient indicates understanding of these issues and agrees to the plan.      Kelsea Bonilla MD  7/9/2024  11:37 AM

## 2025-01-09 RX ORDER — VALSARTAN 80 MG/1
80 TABLET ORAL DAILY
Qty: 30 TABLET | Refills: 0 | Status: SHIPPED | OUTPATIENT
Start: 2025-01-09 | End: 2025-02-07

## 2025-01-09 NOTE — TELEPHONE ENCOUNTER
Please review; protocol failed/No Protocol    Last Office Visit: 08/30/2023  [employer] message sent to patient to schedule an office visit.  Will route to Call Center to call to make an appointment.     Requested Prescriptions   Pending Prescriptions Disp Refills    VALSARTAN 80 MG Oral Tab [Pharmacy Med Name: VALSARTAN 80 MG TABLET] 90 tablet 3     Sig: TAKE 1 TABLET BY MOUTH EVERY DAY       Hypertension Medications Protocol Failed - 1/9/2025  9:39 AM        Failed - CMP or BMP in past 12 months        Failed - In person appointment or virtual visit in the past 12 mos or appointment in next 3 mos     Recent Outpatient Visits              [facility]    Office Visit    9 months ago Encounter for gynecological examination without abnormal finding    [facility] Angela Donnelly MD    Office Visit    1 year ago [facility]ential hypertension    [facility]    [facility] Visit    1 year ago Acute pain of left knee    [facility]    Office Visit    1 year ago Immunization due    [facility]    Nurse Only                      Failed - EGFRCR or GFRAA > 50     GFR Evaluation            Passed - Last BP reading less than 140/90     BP Readings from Last 1 Encounters:   04/03/24 132/88               Passed - Medication is active on med list             Recent Outpatient Visits              6 months ago Epistaxis    [facility] Kelsea Hsu MD    Office Visit    9 months ago Encounter for gynecological examination without abnormal finding    [facility]    Office Visit    1 year ago Essential hypertension    [facility] [place]    Office Visit    1 year ago [facility] pain of left knee    [facility]    Office Visit    1 year ago Immunization due    [facility] [address], [place]    Nurse Only

## 2025-02-07 RX ORDER — VALSARTAN 80 MG/1
80 TABLET ORAL DAILY
Qty: 30 TABLET | Refills: 0 | Status: SHIPPED | OUTPATIENT
Start: 2025-02-07

## 2025-02-07 NOTE — TELEPHONE ENCOUNTER
Please review. Protocol Failed; No Protocol    Future Appointments   Date Time Provider Department Center   2/12/2025  1:00 PM Efra Gill MD [facility]         Requested Prescriptions   Pending Prescriptions Disp Refills    VALSARTAN 80 MG Oral Tab [Pharmacy Med Name: VALSARTAN 80 MG TABLET] 30 tablet 0     Sig: TAKE 1 TABLET BY MOUTH EVERY DAY       Hypertension Medications Protocol Failed - 2/7/2025  3:01 PM        Failed - CMP or BMP in past 12 months        Failed - EGFRCR or GFRAA > 50     GFR Evaluation            Passed - Last BP reading less than 140/90     BP Readings from Last 1 Encounters:   04/03/24 132/88               Passed - In person appointment or virtual visit in the past 12 mos or appointment in next 3 mos     Recent Outpatient Visits              7 months ago Epistaxis    [facility] Kelsea Bonilla MD    Office Visit    10 months ago Encounter for gynecological examination without abnormal finding    [facility] - [facility] Angela Donnelly MD    Office Visit    1 year ago Essential hypertension    [facility] Efra Garner MD    Office Visit    1 year ago Acute pain of left knee    [facility] Efra Gill MD    Office Visit    1 year ago Immunization due    [facility]    Nurse Only          Future Appointments         Provider Department Appt Notes    In 5 days Efra Gill MD [facility] physical - last was 11.26.22  +medication refills/policy informed                    Passed - Medication is active on med list               Future Appointments         Provider Department Appt Notes    In 5 days Efra Gill MD [facility] physical - last was 11.26.22  +medication  refills/policy informed          Recent Outpatient Visits              7 months ago Epistaxis    [facility] [place] Kelsea Bonilla MD    Office Visit    10 months ago Encounter for gynecological examination without abnormal finding    [facility] - OB/GYN Angela Donnelly MD    Office Visit    1 year ago Essential hypertension    [facility] [place] [facility]    Office Visit    1 year ago Acute pain of left knee    [facility] Efra Garner MD    Office Visit    1 year ago Immunization due    [facility]    Nurse Only

## 2025-02-11 ENCOUNTER — TELEPHONE (OUTPATIENT)
Dept: INTERNAL MEDICINE CLINIC | Facility: CLINIC | Age: 60
End: 2025-02-11

## 2025-02-11 NOTE — TELEPHONE ENCOUNTER
Alternative requested.     Current Outpatient Medications   Medication Sig Dispense Refill    valsartan 80 MG Oral Tab Take 1 tablet (80 mg total) by mouth daily. 30 tablet 0     This medication needs to be dispensed as a 90 day supply per insurance requirements.  Please provide additional refills if appropriate.

## 2025-02-11 NOTE — TELEPHONE ENCOUNTER
Left voicemail for pharmacy that patient has to complete appointment before additional refills can be given.     Future Appointments   Date Time Provider Department Center   2/12/2025  1:00 PM Efra Gill MD [facility]

## 2025-02-12 ENCOUNTER — OFFICE VISIT (OUTPATIENT)
Dept: INTERNAL MEDICINE CLINIC | Facility: CLINIC | Age: 60
End: 2025-02-12
Payer: COMMERCIAL

## 2025-02-12 VITALS
HEIGHT: 60 IN | HEART RATE: 71 BPM | DIASTOLIC BLOOD PRESSURE: 80 MMHG | BODY MASS INDEX: 36.4 KG/M2 | WEIGHT: 185.44 LBS | SYSTOLIC BLOOD PRESSURE: 128 MMHG

## 2025-02-12 DIAGNOSIS — I10 ESSENTIAL HYPERTENSION: ICD-10-CM

## 2025-02-12 DIAGNOSIS — Z00.00 ANNUAL PHYSICAL EXAM: Primary | ICD-10-CM

## 2025-02-12 DIAGNOSIS — Z12.11 COLON CANCER SCREENING: ICD-10-CM

## 2025-02-12 DIAGNOSIS — Z13.6 SCREENING FOR HEART DISEASE: ICD-10-CM

## 2025-02-12 DIAGNOSIS — Z12.31 ENCOUNTER FOR SCREENING MAMMOGRAM FOR BREAST CANCER: ICD-10-CM

## 2025-02-12 DIAGNOSIS — Z86.0100 HISTORY OF COLON POLYPS: ICD-10-CM

## 2025-02-12 PROCEDURE — 3008F BODY MASS INDEX DOCD: CPT | Performed by: INTERNAL MEDICINE

## 2025-02-12 PROCEDURE — 90677 PCV20 VACCINE IM: CPT | Performed by: INTERNAL MEDICINE

## 2025-02-12 PROCEDURE — 99396 PREV VISIT EST AGE 40-64: CPT | Performed by: INTERNAL MEDICINE

## 2025-02-12 PROCEDURE — 90471 IMMUNIZATION ADMIN: CPT | Performed by: INTERNAL MEDICINE

## 2025-02-12 PROCEDURE — 3079F DIAST BP 80-89 MM HG: CPT | Performed by: INTERNAL MEDICINE

## 2025-02-12 PROCEDURE — 3074F SYST BP LT 130 MM HG: CPT | Performed by: INTERNAL MEDICINE

## 2025-02-12 RX ORDER — DIPHENOXYLATE HYDROCHLORIDE AND ATROPINE SULFATE 2.5; .025 MG/1; MG/1
1 TABLET ORAL DAILY
COMMUNITY

## 2025-02-12 RX ORDER — GARLIC EXTRACT 500 MG
1 CAPSULE ORAL DAILY
COMMUNITY

## 2025-02-12 NOTE — PROGRESS NOTES
Subjective:     Patient ID: Liliana Mckeon is a 59 year old female.    Pt present today for her annual physical.         History/Other:   Review of Systems   Constitutional: Negative.    HENT: Negative.     Eyes: Negative.    Respiratory: Negative.     Cardiovascular: Negative.  Negative for chest pain, palpitations and leg swelling.        No pnd   Gastrointestinal: Negative.  Negative for abdominal pain, anal bleeding, blood in stool, constipation, diarrhea and vomiting.   Genitourinary: Negative.    Allergic/Immunologic: Negative for food allergies and immunocompromised state.   Neurological:  Negative for syncope.   Hematological: Negative.      Current Outpatient Medications   Medication Sig Dispense Refill    Multiple Vitamin (THERA/BETA-CAROTENE) Oral Tab Take 1 tablet by mouth daily.      acidophilus-pectin Oral Cap Take 1 capsule by mouth daily.      valsartan 80 MG Oral Tab Take 1 tablet (80 mg total) by mouth daily. 30 tablet 0    dilTIAZem  MG Oral Capsule SR 24 Hr TAKE 1 CAPSULE BY MOUTH EVERY DAY 90 capsule 3    Turmeric (QC TUMERIC COMPLEX) 500 MG Oral Cap Take by mouth.      BLACK COHOSH OR Take by mouth.      Meloxicam 7.5 MG Oral Tab Take 1 tablet (7.5 mg total) by mouth daily. (Patient not taking: Reported on 8/30/2023) 14 tablet 0    cyclobenzaprine 10 MG Oral Tab Take 10 mg by mouth as needed. (Patient not taking: Reported on 11/26/2022)      fluticasone propionate 50 MCG/ACT Nasal Suspension 2 sprays by Nasal route daily. (Patient not taking: Reported on 2/22/2023) 16 mL 2    Meloxicam 15 MG Oral Tab  (Patient not taking: Reported on 8/16/2023)       Allergies:Allergies[1]    Past Medical History:    Anemia    Back problem    Carpal tunnel syndrome, bilateral    Disc displacement    Diverticulosis of large intestine    Hiccups    \"Chronic hiccups of unk origin\"    History of blood transfusion    due to heavy periods    History of stomach ulcers    Iron deficiency anemia    Knee  pain, bilateral    Nocturia    Detrol x 3 months     Patellofemoral syndrome    Unspecified essential hypertension    Visual impairment    glaases      Past Surgical History:   Procedure Laterality Date    Colonoscopy      Other  2020    umbilical hernia repair with mesh    Reduction left      2008 BILAT REDUCTION    Reduction of large breast  2010    Reduction right      Tonsillectomy  1980    Total abdominal hysterectomy Bilateral 08/20/2018    Total Abdominal Hysterectomy with Bilateral Salpingectomy, excision of paratubal cyst - [facility]    Tubal ligation Bilateral 1996      Family History   Problem Relation Age of Onset    No Known Problems Sister     Cancer Father         Throat    Diabetes Father     Hypertension Mother       Social History:   Social History     Socioeconomic History    Marital status:    Tobacco Use    Smoking status: Never    Smokeless tobacco: Never   Vaping Use    Vaping status: Never Used   Substance and Sexual Activity    Alcohol use: Yes     Comment: Wine, 2 drinks occasionally     Drug use: No    Sexual activity: Yes     Birth control/protection: Tubal Ligation   Other Topics Concern    Caffeine Concern Yes     Comment: Coffee     Social Drivers of Health      [facility]        Objective:   Physical Exam  Constitutional:       General: She is not in acute distress.     Appearance: She is well-developed. She is obese. She is not ill-appearing, toxic-appearing or diaphoretic.   HENT:      Head: Normocephalic and atraumatic.      Right Ear: Tympanic membrane, ear canal and external ear normal.      Left Ear: Tympanic membrane, ear canal and external ear normal.      Nose: Nose normal.      Mouth/Throat:      Pharynx: No oropharyngeal exudate.   Eyes:      General:         Right eye: No discharge.         Left eye: No discharge.      Conjunctiva/sclera: Conjunctivae normal.      Pupils: Pupils are equal, round, and reactive to light.    Neck:      Vascular: No carotid bruit or JVD.   Cardiovascular:      Rate and Rhythm: Normal rate and regular rhythm.      Heart sounds: Normal heart sounds. No murmur heard.     No gallop.   Pulmonary:      Effort: Pulmonary effort is normal. No respiratory distress.      Breath sounds: Normal breath sounds. No wheezing or rales.   Abdominal:      General: Bowel sounds are normal. There is no distension.      Palpations: Abdomen is soft. There is no mass.      Tenderness: There is no abdominal tenderness. There is no guarding or rebound.   Musculoskeletal:         General: No tenderness. Normal range of motion.      Cervical back: Normal range of motion and neck supple. No rigidity or tenderness.      Right lower leg: No edema.      Left lower leg: No edema.   Lymphadenopathy:      Cervical: No cervical adenopathy.   Skin:     General: Skin is warm and dry.      Coloration: Skin is not jaundiced or pale.      Findings: No rash.   Neurological:      Mental Status: She is alert and oriented to person, place, and time.         Assessment & Plan:   (Z00.00) Annual physical exam  (primary encounter diagnosis)  Plan: CBC With Differential With Platelet, Comp         Metabolic Panel (14), Lipid Panel, TSH W Reflex        To Free T4, Hemoglobin A1C        Routine labs ordered; pt already had her flushot.  Pt given prevnar 20 today;  pt does see her gyne for her gyne exam and will be due this Spring .    (I10) Essential hypertension  Plan: bp controlled with her bp meds. Cpm .    (Z12.31) Encounter for screening mammogram for breast cancer  Plan: mammogram ordered.     (Z12.11) Colon cancer screening  Plan: see below.     (Z86.0100) History of colon polyps  Plan: pt's last colonoscopy was 2021 done by Dr Naranjo  who retired already; I told pt to call his previous clinic and check with their records when she is due for her ffup colonoscopy.     (Z13.6) Screening for heart disease  Plan: advised to do heart scan cac test  .       (Z68.36) BMI 36.0-36.9,adult  Plan: [facility] - Dr. Melissa         [facility] EMMG 9        Referrred to weight clinic to help her lose weight.        No orders of the defined types were placed in this encounter.      Meds This Visit:  Requested Prescriptions      No prescriptions requested or ordered in this encounter       Imaging & Referrals:  None            [1] No Known Allergies

## 2025-02-26 ENCOUNTER — LAB ENCOUNTER (OUTPATIENT)
Dept: LAB | Age: 60
End: 2025-02-26
Attending: INTERNAL MEDICINE
Payer: COMMERCIAL

## 2025-02-26 DIAGNOSIS — Z00.00 ANNUAL PHYSICAL EXAM: ICD-10-CM

## 2025-02-26 LAB
ALBUMIN SERPL-MCNC: 4.6 G/DL (ref 3.2–4.8)
ALBUMIN/GLOB SERPL: 1.6 {RATIO} (ref 1–2)
ALP LIVER SERPL-CCNC: 148 U/L
ALT SERPL-CCNC: 68 U/L
ANION GAP SERPL CALC-SCNC: 5 MMOL/L (ref 0–18)
AST SERPL-CCNC: 29 U/L (ref ?–34)
BASOPHILS # BLD AUTO: 0.04 X10(3) UL (ref 0–0.2)
BASOPHILS NFR BLD AUTO: 0.9 %
BILIRUB SERPL-MCNC: 0.2 MG/DL (ref 0.3–1.2)
BUN BLD-MCNC: 10 MG/DL (ref 9–23)
BUN/CREAT SERPL: 11 (ref 10–20)
CALCIUM BLD-MCNC: 9.2 MG/DL (ref 8.7–10.4)
CHLORIDE SERPL-SCNC: 108 MMOL/L (ref 98–112)
CHOLEST SERPL-MCNC: 150 MG/DL (ref ?–200)
CO2 SERPL-SCNC: 30 MMOL/L (ref 21–32)
CREAT BLD-MCNC: 0.91 MG/DL
DEPRECATED RDW RBC AUTO: 46.3 FL (ref 35.1–46.3)
EGFRCR SERPLBLD CKD-EPI 2021: 74 ML/MIN/1.73M2 (ref 60–?)
EOSINOPHIL # BLD AUTO: 0.24 X10(3) UL (ref 0–0.7)
EOSINOPHIL NFR BLD AUTO: 5.2 %
ERYTHROCYTE [DISTWIDTH] IN BLOOD BY AUTOMATED COUNT: 14.7 % (ref 11–15)
EST. AVERAGE GLUCOSE BLD GHB EST-MCNC: 128 MG/DL (ref 68–126)
FASTING PATIENT LIPID ANSWER: YES
FASTING STATUS PATIENT QL REPORTED: YES
GLOBULIN PLAS-MCNC: 2.8 G/DL (ref 2–3.5)
GLUCOSE BLD-MCNC: 101 MG/DL (ref 70–99)
HBA1C MFR BLD: 6.1 % (ref ?–5.7)
HCT VFR BLD AUTO: 45.6 %
HDLC SERPL-MCNC: 54 MG/DL (ref 40–59)
HGB BLD-MCNC: 14.8 G/DL
IMM GRANULOCYTES # BLD AUTO: 0.01 X10(3) UL (ref 0–1)
IMM GRANULOCYTES NFR BLD: 0.2 %
LDLC SERPL CALC-MCNC: 78 MG/DL (ref ?–100)
LYMPHOCYTES # BLD AUTO: 1.82 X10(3) UL (ref 1–4)
LYMPHOCYTES NFR BLD AUTO: 39.6 %
MCH RBC QN AUTO: 27.8 PG (ref 26–34)
MCHC RBC AUTO-ENTMCNC: 32.5 G/DL (ref 31–37)
MCV RBC AUTO: 85.7 FL
MONOCYTES # BLD AUTO: 0.61 X10(3) UL (ref 0.1–1)
MONOCYTES NFR BLD AUTO: 13.3 %
NEUTROPHILS # BLD AUTO: 1.88 X10 (3) UL (ref 1.5–7.7)
NEUTROPHILS # BLD AUTO: 1.88 X10(3) UL (ref 1.5–7.7)
NEUTROPHILS NFR BLD AUTO: 40.8 %
NONHDLC SERPL-MCNC: 96 MG/DL (ref ?–130)
OSMOLALITY SERPL CALC.SUM OF ELEC: 295 MOSM/KG (ref 275–295)
PLATELET # BLD AUTO: 286 10(3)UL (ref 150–450)
POTASSIUM SERPL-SCNC: 4 MMOL/L (ref 3.5–5.1)
PROT SERPL-MCNC: 7.4 G/DL (ref 5.7–8.2)
RBC # BLD AUTO: 5.32 X10(6)UL
SODIUM SERPL-SCNC: 143 MMOL/L (ref 136–145)
TRIGL SERPL-MCNC: 98 MG/DL (ref 30–149)
TSI SER-ACNC: 1.66 UIU/ML (ref 0.55–4.78)
VLDLC SERPL CALC-MCNC: 15 MG/DL (ref 0–30)
WBC # BLD AUTO: 4.6 X10(3) UL (ref 4–11)

## 2025-02-26 PROCEDURE — 83036 HEMOGLOBIN GLYCOSYLATED A1C: CPT

## 2025-02-26 PROCEDURE — 36415 COLL VENOUS BLD VENIPUNCTURE: CPT

## 2025-02-26 PROCEDURE — 84443 ASSAY THYROID STIM HORMONE: CPT

## 2025-02-26 PROCEDURE — 80053 COMPREHEN METABOLIC PANEL: CPT

## 2025-02-26 PROCEDURE — 85025 COMPLETE CBC W/AUTO DIFF WBC: CPT

## 2025-02-26 PROCEDURE — 80061 LIPID PANEL: CPT

## 2025-03-02 ENCOUNTER — TELEPHONE (OUTPATIENT)
Dept: INTERNAL MEDICINE CLINIC | Facility: CLINIC | Age: 60
End: 2025-03-02

## 2025-03-02 DIAGNOSIS — R74.8 ELEVATED ALKALINE PHOSPHATASE LEVEL: Primary | ICD-10-CM

## 2025-03-03 NOTE — TELEPHONE ENCOUNTER
Refill passed [facility] protocol.    Please review, patient is requesting  a 90 day supply. Thank you.  Last Office Visit: 12/12/2025

## 2025-03-04 RX ORDER — VALSARTAN 80 MG/1
80 TABLET ORAL DAILY
Qty: 90 TABLET | Refills: 1 | Status: SHIPPED | OUTPATIENT
Start: 2025-03-04

## 2025-04-02 ENCOUNTER — HOSPITAL ENCOUNTER (OUTPATIENT)
Dept: MAMMOGRAPHY | Age: 60
Discharge: HOME | End: 2025-04-02
Attending: INTERNAL MEDICINE
Payer: COMMERCIAL

## 2025-04-02 ENCOUNTER — LAB ENCOUNTER (OUTPATIENT)
Dept: LAB | Age: 60
End: 2025-04-02
Attending: INTERNAL MEDICINE
Payer: COMMERCIAL

## 2025-04-02 DIAGNOSIS — R74.8 ELEVATED ALKALINE PHOSPHATASE LEVEL: ICD-10-CM

## 2025-04-02 DIAGNOSIS — Z12.31 ENCOUNTER FOR SCREENING MAMMOGRAM FOR BREAST CANCER: ICD-10-CM

## 2025-04-02 LAB
ALBUMIN SERPL-MCNC: 4.4 G/DL (ref 3.2–4.8)
ALP LIVER SERPL-CCNC: 138 U/L
ALT SERPL-CCNC: 44 U/L
AST SERPL-CCNC: 24 U/L (ref ?–34)
BILIRUB DIRECT SERPL-MCNC: <0.1 MG/DL (ref ?–0.3)
BILIRUB SERPL-MCNC: 0.2 MG/DL (ref 0.3–1.2)
GGT SERPL-CCNC: 58 U/L
PROT SERPL-MCNC: 7.3 G/DL (ref 5.7–8.2)
PTH-INTACT SERPL-MCNC: 66.2 PG/ML (ref 18.5–88)
VIT D+METAB SERPL-MCNC: 35.5 NG/ML (ref 30–100)

## 2025-04-02 PROCEDURE — 77063 BREAST TOMOSYNTHESIS BI: CPT | Performed by: INTERNAL MEDICINE

## 2025-04-02 PROCEDURE — 82977 ASSAY OF GGT: CPT

## 2025-04-02 PROCEDURE — 36415 COLL VENOUS BLD VENIPUNCTURE: CPT

## 2025-04-02 PROCEDURE — 82306 VITAMIN D 25 HYDROXY: CPT

## 2025-04-02 PROCEDURE — 83970 ASSAY OF PARATHORMONE: CPT

## 2025-04-02 PROCEDURE — 80076 HEPATIC FUNCTION PANEL: CPT

## 2025-04-02 PROCEDURE — 77067 SCR MAMMO BI INCL CAD: CPT | Performed by: INTERNAL MEDICINE

## 2025-04-06 ENCOUNTER — TELEPHONE (OUTPATIENT)
Dept: INTERNAL MEDICINE CLINIC | Facility: CLINIC | Age: 60
End: 2025-04-06

## 2025-04-06 DIAGNOSIS — R74.8 ELEVATED ALKALINE PHOSPHATASE LEVEL: Primary | ICD-10-CM

## 2025-04-30 ENCOUNTER — LAB ENCOUNTER (OUTPATIENT)
Dept: LAB | Facility: HOSPITAL | Age: 60
End: 2025-04-30
Attending: NURSE PRACTITIONER
Payer: COMMERCIAL

## 2025-04-30 ENCOUNTER — OFFICE VISIT (OUTPATIENT)
Facility: CLINIC | Age: 60
End: 2025-04-30
Payer: COMMERCIAL

## 2025-04-30 VITALS — HEIGHT: 61 IN | BODY MASS INDEX: 34.74 KG/M2 | WEIGHT: 184 LBS

## 2025-04-30 DIAGNOSIS — K76.0 FATTY LIVER: ICD-10-CM

## 2025-04-30 DIAGNOSIS — R74.8 ELEVATED ALKALINE PHOSPHATASE LEVEL: ICD-10-CM

## 2025-04-30 DIAGNOSIS — R74.8 ELEVATED SERUM GGT LEVEL: ICD-10-CM

## 2025-04-30 DIAGNOSIS — R74.8 ELEVATED SERUM GGT LEVEL: Primary | ICD-10-CM

## 2025-04-30 LAB — AFP-TM SERPL-MCNC: 3.5 NG/ML (ref ?–8)

## 2025-04-30 PROCEDURE — 83516 IMMUNOASSAY NONANTIBODY: CPT

## 2025-04-30 PROCEDURE — 3008F BODY MASS INDEX DOCD: CPT | Performed by: NURSE PRACTITIONER

## 2025-04-30 PROCEDURE — 99204 OFFICE O/P NEW MOD 45 MIN: CPT | Performed by: NURSE PRACTITIONER

## 2025-04-30 PROCEDURE — 82105 ALPHA-FETOPROTEIN SERUM: CPT

## 2025-04-30 PROCEDURE — 36415 COLL VENOUS BLD VENIPUNCTURE: CPT

## 2025-04-30 NOTE — H&P
[facility] - Gastroenterology                                                                                                               Reason for consult: elevated alk phos    Requesting physician or provider: Efra Gill MD    No chief complaint on file.      HPI:   Liliana Mckeon is a 59 year old year-old female with medical history of fatty liver, hypertension, anemia.     She is here today for evaluation of elevated alk phos and gtt with PCP.  Most recent alk phos 130, 148 at most elevated. GGT 58.  Has not yet completed liver ultrasound. Thought that is what she was doing today.   No frequent Tylenol or alcohol use.  Takes a lot of supplements.      Patient denies symptoms of nausea, vomiting, dyspepsia, dysphagia, odynophagia, globus sensation, heartburn, hematemesis, abdominal pain, change in bowel habits, constipation, diarrhea, hematochezia, or melena. she denies recent change in appetite, fever or unintentional weight loss.      NSAIDS: none  Tobacco: none  Alcohol: none  Marijuana: none  Illicit drugs: none    No family history of GI malignancy or IBD or liver pathologies.     No history of adverse reaction to sedation  No BLACK  No anticoagulants/antiplatelet  No pacemaker/defibrillator    Wt Readings from Last 6 Encounters:   04/30/25 184 lb (83.5 kg)   02/12/25 185 lb 7 oz (84.1 kg)   07/09/24 182 lb (82.6 kg)   04/03/24 182 lb (82.6 kg)   08/30/23 182 lb 11.2 oz (82.9 kg)   08/16/23 183 lb 9.6 oz (83.3 kg)        History, Medications, Allergies, ROS:      Past Medical History[1]   Past Surgical History[2]   Family Hx: Family History[3]   Social History: Short Social Hx on File[4]     Medications (Active prior to today's visit):  Current Medications[5]    Allergies:  Allergies[6]    ROS:   CONSTITUTIONAL: negative for fevers, chills, sweats  EYES Negative for scleral icterus or redness,  and diplopia  HEENT: Negative for hoarseness  RESPIRATORY: Negative for cough and severe shortness of breath  CARDIOVASCULAR: Negative for crushing sub-sternal chest pain  GASTROINTESTINAL: See HPI  GENITOURINARY: Negative for dysuria  MUSCULOSKELETAL: Negative for arthralgias and myalgias  SKIN: Negative for jaundice, rash or pruritus  NEUROLOGICAL: Negative for dizziness and headaches  BEHAVIOR/PSYCH: Negative for psychotic behavior    PHYSICAL EXAM:   Height 5' 1\" (1.549 m), weight 184 lb (83.5 kg), last menstrual period 07/31/2018, unknown if currently breastfeeding.    GEN: Alert, no acute distress, well-nourished   ABDOMEN: Soft, symmetrical, non-tender without distention or guarding. No scars or lesions. Umbilicus is midline without herniation. Normoactive bowel sounds are present, No masses, hepatomegaly or splenomegaly noted.   MSK: No erythema, no warmth, no swelling of joints  SKIN: No jaundice, no erythema, no rashes, no lesions  HEMATOLOGIC: No bleeding, no bruising  NEURO: Alert and interactive, COLINDRES  PSYCH: appropriate mood & affect    Labs/Imaging/Procedures:   CT AP 2022:  LIVER: Enlarged, measuring 21.5 cm. Isolated low density adjacent to the falciform ligament is characteristic for focal fatty change. No atrophy, further abnormal density, or significant focal lesion is identified.    No laceration or subcapsular hematoma is detected.  BILIARY: The gallbladder is present, but largely collapsed.  PANCREAS: No lesion, fluid collection, ductal dilatation, or atrophy.    SPLEEN: No enlargement. Negative for laceration or subcapsular hematoma.    ADRENALS:   No defined mass or abnormal enlargement.    KIDNEYS:   Symmetric enhancement is seen without evidence of hydronephrosis or underlying solid masses. Multiple well circumscribed renal hypodensities are present and are not completely characterized, but statistically likely represent cysts.  Negative for laceration, subcapsular hematoma, or evidence of  urinoma.  GI/MESENTERY: A small hiatal hernia is evident. There is no evidence of bowel obstruction. A normal caliber retrocecal appendix is seen without inflammatory manifestations. A heavy stool burden is demonstrated. Scattered colonic diverticula are present  in the descending and sigmoid colon. There is no colonic wall thickening or pericolonic fat stranding. Notably, however, there is marked redundancy of the sigmoid colon.  URINARY BLADDER: No visible calculus or focal wall thickening.  PELVIC NODES: No lymphadenopathy.    PELVIC ORGANS: Postoperative changes of hysterectomy are demonstrated. There is a bilobed right ovarian cystic lesion measuring 2.9 x 3.0 x 3.9 cm. Deep pelvic calcifications likely represent phleboliths.    VASCULATURE:   No aneurysm is detected. No active extravasation is identified. There is no evidence of periaortic hematoma.  RETROPERITONEUM: No mass or lymphadenopathy is apparent. There is no evidence of retroperitoneal hematoma or asymmetric enlargement of the psoas musculature.    BONES:   Angulated levoscoliosis of the lumbar spine is seen with slight compensatory dextroscoliosis of the lower thoracic spine. There are hypoplastic 12th ribs, particularly on the right. Multilevel degenerative changes are seen throughout the spine.     No displaced fracture deformity is detected. There are no suspicious osseous lesions.  ABDOMINAL WALL: There is a small fat-containing umbilical hernia. Additionally, there is a moderate-sized supraumbilical midline ventral abdominal hernia containing protrusion of fat with slight infiltration of the mesenteric fat. There is transversely  oriented lower abdominopelvic wall scarring, suggestive of prior Pfannenstiel incision. Mild dependent subcutaneous edema is noted.  No soft tissue hematoma or contusion is demonstrated.  OTHER: No free air or fluid is seen in the abdomen or pelvis.                    Impression   CONCLUSION:  1. No visceral abdominal  injury is identified. No evidence of perforated viscus.       2. No acute intra-abdominal process is identified. The etiology of the patient's symptoms is unclear from this study.     3. Bilobed right ovarian cystic lesion.     4. Midline ventral abdominal hernia with protrusion of mesenteric fat, but no entrapment of bowel.     5. Status post hysterectomy.     6. Hepatomegaly.     7. Lesser incidental findings as above.            Latest Reference Range & Units 08/30/23 09:48 02/26/25 10:16 04/02/25 13:42   Glucose 70 - 99 mg/dL 103 (H) 101 (H)    HEMOGLOBIN A1c <5.7 %  6.1 (H)    ESTIMATED AVERAGE GLUCOSE 68 - 126 mg/dL  128 (H)    Sodium 136 - 145 mmol/L 141 143    Potassium 3.5 - 5.1 mmol/L 3.8 4.0    Chloride 98 - 112 mmol/L 110 108    Carbon Dioxide, Total 21.0 - 32.0 mmol/L 29.0 30.0    BUN 9 - 23 mg/dL 12 10    CREATININE 0.55 - 1.02 mg/dL 0.83 0.91    CALCIUM 8.7 - 10.4 mg/dL 9.2 9.2    BUN/CREATININE RATIO 10.0 - 20.0   11.0    EGFR >=60 mL/min/1.73m2 83 74    ANION GAP 0 - 18 mmol/L 2 5    CALCULATED OSMOLALITY 275 - 295 mOsm/kg 292 295    ALKALINE PHOSPHATASE 46 - 118 U/L 130 (H) 148 (H) 138 (H)   AST (SGOT) <34 U/L 13 (L) 29 24   ALT (SGPT) 10 - 49 U/L 43 68 (H) 44   Total Bilirubin 0.3 - 1.2 mg/dL 0.3 0.2 (L) 0.2 (L)   Bilirubin, Direct <=0.3 mg/dL   <0.1   Globulin 2.0 - 3.5 g/dL 4.0 2.8    GAMMA GLUTAMYL TRANSFERASE <38 U/L   58 (H)   Cholesterol, Total <200 mg/dL 137 150    Triglycerides 30 - 149 mg/dL 74 98    HDL Cholesterol 40 - 59 mg/dL 63 (H) 54    VLDL 0 - 30 mg/dL 11 15    NON-HDL CHOLESTEROL <130 mg/dL 74 96    LDL Cholesterol Calc <100 mg/dL 59 78    A/G Ratio 1.0 - 2.0  0.9 (L) 1.6    PROTEIN, TOTAL 5.7 - 8.2 g/dL 7.4 7.4 7.3   Albumin 3.2 - 4.8 g/dL 3.4 4.6 4.4   VITAMIN D, 25-OH, TOTAL 30.0 - 100.0 ng/mL   35.5   Patient Fasting for CMP?  Yes Yes    Patient Fasting for Lipid?  Yes Yes      .  ASSESSMENT/PLAN:   Liliana Mckeon is a 59 year old year-old female with medical history  of fatty liver, hypertension, anemia.     1. Elevated serum GGT level  - AFP, Tumor Marker, Serum; Future    2. Elevated alkaline phosphatase level  - AFP, Tumor Marker, Serum; Future    3. Fatty liver  - AFP, Tumor Marker, Serum; Future     Will add AFP to pending labs.  Discussed causes of elevated alk phos- cholestasis, hepatitis, liver tumors, cirrhosis, fatty liver, drug induce liver diseases.   Advised could be related to supplement use, would recommend stopping black cohosh as it could be hepatoxic.  Follow up pending ultrasound results.       Patient Instructions   -Schedule ultrasound for liver  -Stop black cohosh       --------------------------------------------------------------------------------------------------  The best diet for the fatty liver is a low carbohydrate mediterranean diet. Work on reducing or eliminating carbohydrates (breads, pasta, rice, tortilla, potatoes, beans, corn, soda, sweet drinks or even excess fruit, desserts). Fill your plate with nonstarchy vegetables and lean protein (fish and chicken). Do not avoid healthy fats (from a plant or a fish).    Avoid high fructose corn syrups and red meats. Avoid calorie-dense nighttime meals; you should try to eat smaller, more frequent meals throughout the day to avoid excessive nighttime hunger. Avoid fruit juices or sweet beverages.     Look into using low carbohydrate or no carbohydrate alternatives: cauliflower rice (needs to be seasoned) or cauliflower mash, zucchini noodles, low carb bread (healthy life bread has 11g net carbs per 2 slices, there are also no carb breads available (aldi \"zero net carb bread\", costco \"keto friendly bread\"), low carb tortillas (mission carb balance), cauliflower pizza crust (careful as some are high carb from rice flour). Look online for low carb recipes.     Consider meal replacement with a low fat, low sugar protein shake along with raw vegetables and a large glass of water. This will fill you up and  suppress your appetite.     Use berries or dark chocolate to help curb sweet cravings.     In general, you should try and eat \"real\" foods - this means foods that do not come in plastics, cans, or boxes.     Consider logging your diet on an demarcus like [employer]. This will help us determine how much of your diet comes from carbohydrates, protein and fat. We generally aim to get no more than 30% of calories from carbs, 30-35% from protein and 40% from fat with most of the fat being from healthy plant or fish-based sources. Or aim for no more than 50-75g net carbs (total minus fiber) daily.    - In Answer[employer]To, go to menu >> nutrition >> macros >> week view to see your nutrient distribution.              Orders This Visit:  Orders Placed This Encounter   Procedures    AFP, Tumor Marker, Serum       Meds This Visit:  Requested Prescriptions      No prescriptions requested or ordered in this encounter       Imaging & Referrals:  None      JAMIL Irizarry    [facility] Gastroenterology  4/30/2025               [1]   Past Medical History:   Anemia    Back problem    Carpal tunnel syndrome, bilateral    Disc displacement    Diverticulosis of large intestine    Hiccups    \"Chronic hiccups of unk origin\"    History of blood transfusion    due to heavy periods    History of stomach ulcers    Iron deficiency anemia    Knee pain, bilateral    Nocturia    Detrol x 3 months     Patellofemoral syndrome    Unspecified essential hypertension    Visual impairment    glaases   [2]   Past Surgical History:  Procedure Laterality Date    Colonoscopy      2021 done  by Dr Naranjo    Other  2020    umbilical hernia repair with mesh    Reduction left      2008 BILAT REDUCTION    Reduction of large breast  2010    Reduction right      Tonsillectomy  1980    Total abdominal hysterectomy Bilateral 08/20/2018    Total Abdominal Hysterectomy with Bilateral Salpingectomy, excision of paratubal cyst - Ghia    Tubal ligation  Bilateral 1996   [3]   Family History  Problem Relation Age of Onset    Cancer Father         Throat    Diabetes Father     Hypertension Mother     No Known Problems Sister    [4]   Social History  Socioeconomic History    Marital status:    Tobacco Use    Smoking status: Never    Smokeless tobacco: Never   Vaping Use    Vaping status: Never Used   Substance and Sexual Activity    Alcohol use: Yes     Comment: Wine, 2 drinks occasionally     Drug use: No    Sexual activity: Yes     Birth control/protection: Tubal Ligation   Other Topics Concern    Caffeine Concern Yes     Comment: Coffee     Social Drivers of Health      [facility]   [5]   Current Outpatient Medications   Medication Sig Dispense Refill    valsartan 80 MG Oral Tab Take 1 tablet (80 mg total) by mouth daily. 90 tablet 1    Multiple Vitamin (THERA/BETA-CAROTENE) Oral Tab Take 1 tablet by mouth daily.      acidophilus-pectin Oral Cap Take 1 capsule by mouth daily.      dilTIAZem  MG Oral Capsule SR 24 Hr TAKE 1 CAPSULE BY MOUTH EVERY DAY 90 capsule 3    Turmeric (QC TUMERIC COMPLEX) 500 MG Oral Cap Take by mouth.      BLACK COHOSH OR Take by mouth.      fluticasone propionate 50 MCG/ACT Nasal Suspension 2 sprays by Nasal route daily. (Patient not taking: Reported on 2/22/2023) 16 mL 2   [6] No Known Allergies

## 2025-04-30 NOTE — PATIENT INSTRUCTIONS
-Schedule ultrasound for liver  -Stop black cohosh       --------------------------------------------------------------------------------------------------  The best diet for the fatty liver is a low carbohydrate mediterranean diet. Work on reducing or eliminating carbohydrates (breads, pasta, rice, tortilla, potatoes, beans, corn, soda, sweet drinks or even excess fruit, desserts). Fill your plate with nonstarchy vegetables and lean protein (fish and chicken). Do not avoid healthy fats (from a plant or a fish).    Avoid high fructose corn syrups and red meats. Avoid calorie-dense nighttime meals; you should try to eat smaller, more frequent meals throughout the day to avoid excessive nighttime hunger. Avoid fruit juices or sweet beverages.     Look into using low carbohydrate or no carbohydrate alternatives: cauliflower rice (needs to be seasoned) or cauliflower mash, zucchini noodles, low carb bread (healthy life bread has 11g net carbs per 2 slices, there are also no carb breads available (aldi \"zero net carb bread\", costco \"keto friendly bread\"), low carb tortillas (mission carb balance), cauliflower pizza crust (careful as some are high carb from rice flour). Look online for low carb recipes.     Consider meal replacement with a low fat, low sugar protein shake along with raw vegetables and a large glass of water. This will fill you up and suppress your appetite.     Use berries or dark chocolate to help curb sweet cravings.     In general, you should try and eat \"real\" foods - this means foods that do not come in plastics, cans, or boxes.     Consider logging your diet on an demarcus like [employer]. This will help us determine how much of your diet comes from carbohydrates, protein and fat. We generally aim to get no more than 30% of calories from carbs, 30-35% from protein and 40% from fat with most of the fat being from healthy plant or fish-based sources. Or aim for no more than 50-75g net carbs (total minus  fiber) daily.    - In myfitnesspal, go to menu >> nutrition >> macros >> week view to see your nutrient distribution.

## 2025-05-01 LAB — M2 MITOCHONDRIAL AB: 28.5 UNITS

## 2025-05-07 ENCOUNTER — HOSPITAL ENCOUNTER (OUTPATIENT)
Dept: ULTRASOUND IMAGING | Facility: HOSPITAL | Age: 60
Discharge: HOME | End: 2025-05-07
Attending: INTERNAL MEDICINE
Payer: COMMERCIAL

## 2025-05-07 DIAGNOSIS — R74.8 ELEVATED ALKALINE PHOSPHATASE LEVEL: ICD-10-CM

## 2025-05-07 PROCEDURE — 76705 ECHO EXAM OF ABDOMEN: CPT | Performed by: INTERNAL MEDICINE

## 2025-09-01 RX ORDER — VALSARTAN 80 MG/1
80 TABLET ORAL DAILY
Qty: 90 TABLET | Refills: 1 | Status: SHIPPED | OUTPATIENT
Start: 2025-09-01

## (undated) DEVICE — MEGA SUTURECUT ND: Brand: ENDOWRIST

## (undated) DEVICE — MEDI-VAC NON-CONDUCTIVE SUCTION TUBING: Brand: CARDINAL HEALTH

## (undated) DEVICE — SUTURE PLAIN GUT 3-0 CT-1

## (undated) DEVICE — 3M™ STERI-STRIP™ REINFORCED ADHESIVE SKIN CLOSURES, R1547, 1/2 IN X 4 IN (12 MM X 100 MM), 6 STRIPS/ENVELOPE: Brand: 3M™ STERI-STRIP™

## (undated) DEVICE — DISPOSABLE SUCTION/IRRIGATOR TUBE SET: Brand: AHTO

## (undated) DEVICE — GAUZE SPONGES,12 PLY: Brand: CURITY

## (undated) DEVICE — ARM DRAPE

## (undated) DEVICE — LAPAROVUE VISIBILITY SYSTEM LAPAROSCOPIC SOLUTIONS: Brand: LAPAROVUE

## (undated) DEVICE — INSUFFLATION NEEDLE TO ESTABLISH PNEUMOPERITONEUM.: Brand: INSUFFLATION NEEDLE

## (undated) DEVICE — ENCORE® LATEX ACCLAIM SIZE 8, STERILE LATEX POWDER-FREE SURGICAL GLOVE: Brand: ENCORE

## (undated) DEVICE — SUTURE SILK 0 SH

## (undated) DEVICE — STERILE LATEX POWDER-FREE SURGICAL GLOVESWITH NITRILE COATING: Brand: PROTEXIS

## (undated) DEVICE — SUTURE CHROMIC GUT 3-0 SH

## (undated) DEVICE — SUTURE CHROMIC GUT 2-0 MH

## (undated) DEVICE — SUTURE VLOC 180 2-0 9\" 2145

## (undated) DEVICE — LAPAROTOMY: Brand: MEDLINE INDUSTRIES, INC.

## (undated) DEVICE — UNDYED BRAIDED (POLYGLACTIN 910), SYNTHETIC ABSORBABLE SUTURE: Brand: COATED VICRYL

## (undated) DEVICE — Device

## (undated) DEVICE — TIP COVER ACCESSORY

## (undated) DEVICE — 40580 - THE PINK PAD - ADVANCED TRENDELENBURG POSITIONING KIT: Brand: 40580 - THE PINK PAD - ADVANCED TRENDELENBURG POSITIONING KIT

## (undated) DEVICE — DRAPE SHEET LAPCHOLE 124X100X7

## (undated) DEVICE — PERMANENT CAUTERY HOOK: Brand: ENDOWRIST

## (undated) DEVICE — SOL H2O 1000ML BTL

## (undated) DEVICE — ABDOMINAL BINDER: Brand: DEROYAL

## (undated) DEVICE — SOL  .9 1000ML BTL

## (undated) DEVICE — SPONGE LAP 18X18 XRAY STRL

## (undated) DEVICE — TROCAR: Brand: KII FIOS FIRST ENTRY

## (undated) DEVICE — DERMABOND LIQUID ADHESIVE

## (undated) DEVICE — SUTURE VICRYL 0 J340H

## (undated) DEVICE — SOL  .9 3000ML

## (undated) DEVICE — CANNULA SEAL

## (undated) DEVICE — COLUMN DRAPE

## (undated) DEVICE — DRAPE SHEET TRANSVERSE LAP

## (undated) DEVICE — SMOKE EVACUATION TUBING 7/8 IN (22 MM) X 10 FT (3.1 M) TUBE WITH 8 IN (20 CM) INTEGRAL WAND & SPONGE GUARD: Brand: CONMED BUFFALO FILTER

## (undated) DEVICE — 3M™ STERI-STRIP™ COMPOUND BENZOIN TINCTURE 40 BAGS/CARTON 4 CARTONS/CASE C1544: Brand: 3M™ STERI-STRIP™

## (undated) DEVICE — SUTURE VICRYL 0 CT-1

## (undated) DEVICE — SUTURE VLOC 180 0 9\" 0346

## (undated) DEVICE — SUTURE VICRYL 0 J906G

## (undated) DEVICE — MONOPOLAR CURVED SCISSORS: Brand: ENDOWRIST

## (undated) DEVICE — SUTURE VLOC 180 2-0 12\" 0315

## (undated) DEVICE — SUCTION CANISTER, 3000CC,SAFELINER: Brand: DEROYAL

## (undated) DEVICE — NDLCTR: FOAM/ADHESIVE 10CT 96/CS: Brand: MEDICAL ACTION INDUSTRIES

## (undated) DEVICE — SUTURE VLOC NONAB 0 18\" 0326

## (undated) DEVICE — BLADE ELECTRODE: Brand: EDGE

## (undated) DEVICE — TRAY FOLEY BDX 16F STATLOCK

## (undated) DEVICE — ROBOTIC: Brand: MEDLINE INDUSTRIES, INC.

## (undated) DEVICE — 3M™ MEDITPORE™ SOFT CLOTH TAPE 6 IN X 10 YD 12 ROLLS/CASE 2966: Brand: 3M™ MEDIPORE™

## (undated) DEVICE — CADIERE FORCEPS: Brand: ENDOWRIST

## (undated) DEVICE — ENCORE® LATEX MICRO SIZE 8, STERILE LATEX POWDER-FREE SURGICAL GLOVE: Brand: ENCORE

## (undated) DEVICE — CONTAINER SPEC STR 4OZ GRY LID

## (undated) NOTE — LETTER
10/12/2017          To Whom It May Concern:    Keri Koenig is currently under my medical care and may return to work at this time. She may return to work on 10/14/17. Activity is restricted as follows: light duty.     If you require additional info

## (undated) NOTE — LETTER
10/10/2018              Royer Proffer        [address]        [facility] [address]         To Whom It May Concern,    Genaro Pinedar may return to work without restrictions as of Oct 15th.     Sincerely,    Margaret Wall MD  [address] [place] [address]

## (undated) NOTE — LETTER
10/12/17          [place] Other  :  1965      To Whom It May Concern: This patient was seen in our office. Work status: May return to work full-time with restrictions of light duty. May return to work status per above effective ***.     If

## (undated) NOTE — LETTER
10/2/2017              Sudarshan Shetty        [address]        [facility] [address]         To whom it may concern,    This is to certify that Raul Quiroz was seen and evaluated in our clinic today.  She should be excused from work fro

## (undated) NOTE — LETTER
10/2/2017             RE: Debbie Frey        [address]        [facility] [address]          To whom it may concern,    This is to certify that Erin Rice was seem and evaluated in our clinic today.   She should be excused from wor

## (undated) NOTE — LETTER
MAJOR CASE PREOPERATIVE INSTRUCTIONS  7/19/2018    Dear Jagruti Sofia are having a total abdominal hysterectomy with bilateral salpingectomy on 8/20/18 at 7:30am.     Please obtain pre op clearance from your primary physician within 30 days of your schedu [address] [address]  [phone]    Document electronically generated by:  Urbano Madrid

## (undated) NOTE — LETTER
09/13/21        [address]      Dear Aleks Sanchez,    [facility] records indicate that you have outstanding lab work and or testing that was ordered for you and has not yet been completed:  Orders Placed This Encounte

## (undated) NOTE — LETTER
8/14/2018              China Alfaro        [address] [facility] [address]                 To Whom It May Concern,    Verenice Alvarez is currently under my medical care and is scheduled for surgery on August 20, 2018.  Lisa Hay is

## (undated) NOTE — MR AVS SNAPSHOT
[address], [address]  [phone]               Thank you for choosing us for your health care visit with Rashad Paula MD.  We are glad to serve you and happy to provide you with this summar Where to Get Your Medications      These medications were sent to [id number], [address], [phone], [address] [address]     Phone:  [phone]    - active are less likely to develop some chronic diseases than adults who are inactive.      HOW TO GET STARTED: HOW TO STAY MOTIVATED:   Start activities slowly and build up over time Do what you like   Get your heart pumping – brisk walking, biking, swimmin

## (undated) NOTE — LETTER
INSTRUCTIONS FOR PRE-SURGICAL   ANTIMICROBIAL BATH/SHOWER    Your doctor has recommended a pre-surgical CHG (chlorhexidine gluconate) shower/bath with Betasept (also sold as Hibiclens). It reduces bacteria that can potentially cause infection.   Betasept Keep out of reach of children. If swallowed get medica help or contact [facility]. Store between 60-80 degrees F. Fabric Warning! CHG WILL STAIN YOUR FABRICS! Use with care around shower curtains, towels washcloths rugs and clothes.   Wipe

## (undated) NOTE — LETTER
4/4/2019              Jessica Mckeon        [address] [facility] [address]         To whom it may concern,      This is to certify that Ms Sushant Hall was evaluated in our clinic today.  She should be excused from work starting [employer]